# Patient Record
Sex: FEMALE | Race: WHITE | NOT HISPANIC OR LATINO | Employment: UNEMPLOYED | URBAN - NONMETROPOLITAN AREA
[De-identification: names, ages, dates, MRNs, and addresses within clinical notes are randomized per-mention and may not be internally consistent; named-entity substitution may affect disease eponyms.]

---

## 2023-05-17 ENCOUNTER — HOSPITAL ENCOUNTER (OUTPATIENT)
Dept: DATA CONVERSION | Facility: HOSPITAL | Age: 43
End: 2023-05-17
Attending: INTERNAL MEDICINE | Admitting: INTERNAL MEDICINE
Payer: COMMERCIAL

## 2023-05-17 DIAGNOSIS — F41.9 ANXIETY DISORDER, UNSPECIFIED: ICD-10-CM

## 2023-05-17 DIAGNOSIS — R16.0 HEPATOMEGALY, NOT ELSEWHERE CLASSIFIED: ICD-10-CM

## 2023-05-17 DIAGNOSIS — F32.A DEPRESSION, UNSPECIFIED: ICD-10-CM

## 2023-05-17 DIAGNOSIS — Z80.0 FAMILY HISTORY OF MALIGNANT NEOPLASM OF DIGESTIVE ORGANS: ICD-10-CM

## 2023-05-17 DIAGNOSIS — D12.5 BENIGN NEOPLASM OF SIGMOID COLON: ICD-10-CM

## 2023-05-17 DIAGNOSIS — Z12.11 ENCOUNTER FOR SCREENING FOR MALIGNANT NEOPLASM OF COLON: ICD-10-CM

## 2023-05-17 DIAGNOSIS — F17.210 NICOTINE DEPENDENCE, CIGARETTES, UNCOMPLICATED: ICD-10-CM

## 2023-05-17 LAB
ALANINE AMINOTRANSFERASE (SGPT) (U/L) IN SER/PLAS: 17 U/L (ref 7–45)
ALBUMIN (G/DL) IN SER/PLAS: 4 G/DL (ref 3.4–5)
ALKALINE PHOSPHATASE (U/L) IN SER/PLAS: 73 U/L (ref 33–110)
ASPARTATE AMINOTRANSFERASE (SGOT) (U/L) IN SER/PLAS: 17 U/L (ref 9–39)
BILIRUBIN DIRECT (MG/DL) IN SER/PLAS: 0 MG/DL (ref 0–0.3)
BILIRUBIN TOTAL (MG/DL) IN SER/PLAS: 0.6 MG/DL (ref 0–1.2)
PROTEIN TOTAL: 7.1 G/DL (ref 6.4–8.2)

## 2023-05-18 LAB
HEPATITIS A TOTAL AB INTERPRETATION: NONREACTIVE
HEPATITIS B VIRUS CORE AB (PRESENCE) IN SER/PLAS BY IMM: NONREACTIVE
HEPATITIS B VIRUS SURFACE AB (MIU/ML) IN SERUM: <3.1 MIU/ML
HEPATITIS B VIRUS SURFACE AG PRESENCE IN SERUM: NONREACTIVE
HEPATITIS C VIRUS AB PRESENCE IN SERUM: NONREACTIVE

## 2023-05-24 LAB
COMPLETE PATHOLOGY REPORT: NORMAL
CONVERTED CLINICAL DIAGNOSIS-HISTORY: NORMAL
CONVERTED FINAL DIAGNOSIS: NORMAL
CONVERTED FINAL REPORT PDF LINK TO COPY AND PASTE: NORMAL
CONVERTED GROSS DESCRIPTION: NORMAL

## 2023-09-07 VITALS
RESPIRATION RATE: 17 BRPM | HEIGHT: 65 IN | DIASTOLIC BLOOD PRESSURE: 90 MMHG | TEMPERATURE: 97.3 F | HEART RATE: 83 BPM | WEIGHT: 216.05 LBS | SYSTOLIC BLOOD PRESSURE: 137 MMHG | BODY MASS INDEX: 36 KG/M2

## 2023-09-30 NOTE — H&P
History of Present Illness:   Pregnant/Lactating:  ·  Are You Pregnant no (1)   ·  Are You Currently Breastfeeding no (1)     History Present Illness:  Reason for surgery: Screening colonoscopy, family  history of paternal uncle   HPI:    Patient is a 43-year-old female here for a colonoscopy.  She has been seen by GI and hepatology for an abnormal CT scan.  Had a recent MRI to confirm hepatomegaly.   No splenomegaly.  She has a family history of liver cancer in her father who was diagnosed in 2022 and has passed within the year.  She also has a family history of colon cancer in her paternal uncle who now has a colostomy bag and the cancer has come  back for the third time.  She has a mix of constipation and then diarrhea.  She does take Rolaids for nausea/vomiting/GERD.  She also admits to family history of Crohn's and diverticulitis.  She said it within this past year she has smoked crack cocaine,  marijuana and smokes 1/2 pack/day of cigarettes.  She also drinks alcohol socially.  She just did her hepatitis panel today.  No other red flag symptoms.  No abdominal surgeries.  No blood thinners.  She tolerated the prep well.  Denies any fever, chills,  shortness of breath, chest pain.    Past medical history: Anxiety, arthritis, depression, insomnia, tympanic membrane perforation, macrocytic anemia  Past surgical history: Ear pressure equalization, knee replacement, tonsillectomy and adenoidectomy  Social history: Everyday smoker, history of crack cocaine/marijuana, social alcohol use    Allergies:        Allergies:  ·  No Known Allergies :     Home Medication Review:   Home Medications Reviewed: yes     Impression/Procedure:   ·  Impression and Planned Procedure: Screening colonoscopy       ERAS (Enhanced Recovery After Surgery):  ·  ERAS Patient: no     Review of Systems:   Review of Systems:  Constitutional: NEGATIVE: Fever, Chills, Anorexia,  Weight Loss, Malaise     Eyes: NEGATIVE: Vision Loss/ Change      ENMT: NEGATIVE: Nasal Discharge, Nasal Congestion     Respiratory: NEGATIVE: Dry Cough, Productive Cough,  Shortness of Breath     Cardiac: NEGATIVE: Chest Pain     Gastrointestinal: POSITIVE: Diarrhea, Constipation, Abdominal Pain ; NEGATIVE: Nausea, Vomiting     Musculoskeletal: NEGATIVE: Weakness     Neurological: NEGATIVE: Headache     Skin: NEGATIVE: Rash     Hematologic/Lymph: NEGATIVE: Easy Bleeding     All Other Systems: All other systems reviewed and  are negative       Vital Signs:  Temperature C: 36.3 degrees C   Temperature F: 97.3 degrees F   Heart Rate: 83 beats per minute   Respiratory Rate: 17 breath per minute   Blood Pressure Systolic: 137 mm/Hg   Blood Pressure Diastolic: 90 mm/Hg     Physical Exam by System:    Constitutional: Well developed, A&O x3, in no apparent  distress, alert and cooperative   Eyes: PERRL, EOMI, nonicteric   ENMT: Mucous membranes moist, no apparent injury   Head/Neck: Neck supple, no apparent injury   Respiratory/Thorax: Patent airways, CTAB, normal  breath sounds with good chest expansion, thorax symmetric   Cardiovascular: Regular, normal S 1and S 2, no murmurs,  2+ equal pulses of the extremities   Gastrointestinal: Nondistended, soft, non-tender,  no rebound tenderness or guarding, no masses palpable, no organomegaly, +BS   Neurological: CN II-XII grossly intact   Psychological: Appropriate mood and behavior   Skin: Warm and dry, no lesions, no rashes     Consent:   COVID-19 Consent:  ·  COVID-19 Risk Consent Surgeon has reviewed key risks related to the risk of [place] COVID-19 and if they contract COVID-19 what the risks are.       Electronic Signatures:  Madalyn Samano (PAC)   (Signed 17-May-2023 12:39)   Authored: History of Present Illness, Allergies, Home Medication Review, Impression/Procedure, Review of Systems, Physical Exam, ERAS, Consent, Note Completion  Louis Phelan (DO)   (Signed 24-May-2023 14:34)   Co-Signer: History of Present  "Illness, Allergies, Home Medication Review, Impression/Procedure, Review of Systems, Physical Exam, ERAS, Consent, Note Completion    Last Updated: 24-May-2023 14:34 by Louis Phelan (DO)    References:  1.  Data Referenced From \"Patient Profile - Preop v3\" 17-May-2023 11:53  "

## 2023-10-13 DIAGNOSIS — K59.00 CONSTIPATION, UNSPECIFIED CONSTIPATION TYPE: ICD-10-CM

## 2023-10-13 RX ORDER — PSYLLIUM HUSK (WITH SUGAR) 3.4 G/12 G
POWDER (GRAM) ORAL
COMMUNITY
Start: 2022-11-11 | End: 2023-10-13 | Stop reason: SDUPTHER

## 2023-10-17 RX ORDER — PSYLLIUM HUSK (WITH SUGAR) 3.4 G/12 G
POWDER (GRAM) ORAL
Qty: 368 G | Refills: 0 | Status: SHIPPED | OUTPATIENT
Start: 2023-10-17 | End: 2024-03-08 | Stop reason: ALTCHOICE

## 2023-12-21 ENCOUNTER — TELEPHONE (OUTPATIENT)
Dept: PRIMARY CARE | Facility: CLINIC | Age: 43
End: 2023-12-21
Payer: COMMERCIAL

## 2023-12-21 DIAGNOSIS — U07.1 COVID-19: Primary | ICD-10-CM

## 2023-12-21 NOTE — TELEPHONE ENCOUNTER
Pt called c/o cough, sinus drainage -yellow phlegm  Covid + 12/19/2023  Allergy to tuna fish and latex

## 2023-12-22 RX ORDER — DEXAMETHASONE 4 MG/1
4 TABLET ORAL
Qty: 5 TABLET | Refills: 0 | Status: SHIPPED | OUTPATIENT
Start: 2023-12-22 | End: 2024-03-08 | Stop reason: ALTCHOICE

## 2023-12-22 RX ORDER — FLUTICASONE PROPIONATE 50 MCG
1 SPRAY, SUSPENSION (ML) NASAL DAILY
Qty: 16 G | Refills: 0 | Status: SHIPPED | OUTPATIENT
Start: 2023-12-22 | End: 2024-03-08 | Stop reason: ALTCHOICE

## 2023-12-22 RX ORDER — AZITHROMYCIN 500 MG/1
500 TABLET, FILM COATED ORAL DAILY
Qty: 5 TABLET | Refills: 0 | Status: SHIPPED | OUTPATIENT
Start: 2023-12-22 | End: 2023-12-27

## 2024-02-05 ENCOUNTER — TELEPHONE (OUTPATIENT)
Dept: PRIMARY CARE | Facility: CLINIC | Age: 44
End: 2024-02-05
Payer: COMMERCIAL

## 2024-02-05 DIAGNOSIS — F17.209 NICOTINE DEPENDENCE WITH NICOTINE-INDUCED DISORDER, UNSPECIFIED NICOTINE PRODUCT TYPE: Primary | ICD-10-CM

## 2024-02-06 RX ORDER — NICOTINE 7MG/24HR
PATCH, TRANSDERMAL 24 HOURS TRANSDERMAL
Qty: 14 PATCH | Refills: 0 | Status: SHIPPED | OUTPATIENT
Start: 2024-02-06

## 2024-02-06 RX ORDER — IBUPROFEN 200 MG
TABLET ORAL
Qty: 14 PATCH | Refills: 0 | Status: SHIPPED | OUTPATIENT
Start: 2024-02-06

## 2024-02-21 ENCOUNTER — TELEPHONE (OUTPATIENT)
Dept: OBSTETRICS AND GYNECOLOGY | Facility: CLINIC | Age: 44
End: 2024-02-21
Payer: COMMERCIAL

## 2024-02-21 DIAGNOSIS — N92.6 IRREGULAR MENSTRUAL BLEEDING: Primary | ICD-10-CM

## 2024-02-21 DIAGNOSIS — R10.2 PELVIC PAIN: ICD-10-CM

## 2024-02-21 NOTE — TELEPHONE ENCOUNTER
Spoke to pt advised of Dr Valdez message / pt agrees /Pelvic us pended / advised office will call when order signed / link to my chart test to pt

## 2024-02-21 NOTE — TELEPHONE ENCOUNTER
Patricia COX pt last seen as NP 05/23/2023 / Pt calling states that she is having irreg menses for past 3  months /pt states having menses x 3 a month lasting 4 days reg normal bleeding / pt having intermittent pelvic pain / cramps when having bleeding / painful intercourse / tired all the time/ pt wanting hysterectomy / advised message to Dr aCusey

## 2024-02-22 NOTE — TELEPHONE ENCOUNTER
Spoke to pt advised US order placed / pt will call cs to sched us / number given  Pt to  call office back with US date to sched f/up  with WC

## 2024-03-01 ENCOUNTER — HOSPITAL ENCOUNTER (OUTPATIENT)
Dept: RADIOLOGY | Facility: HOSPITAL | Age: 44
Discharge: HOME | End: 2024-03-01
Payer: COMMERCIAL

## 2024-03-01 DIAGNOSIS — N92.6 IRREGULAR MENSTRUAL BLEEDING: ICD-10-CM

## 2024-03-01 DIAGNOSIS — R10.2 PELVIC PAIN: ICD-10-CM

## 2024-03-01 PROCEDURE — 76856 US EXAM PELVIC COMPLETE: CPT | Performed by: RADIOLOGY

## 2024-03-01 PROCEDURE — 76830 TRANSVAGINAL US NON-OB: CPT | Performed by: RADIOLOGY

## 2024-03-01 PROCEDURE — 76856 US EXAM PELVIC COMPLETE: CPT

## 2024-03-08 ENCOUNTER — LAB (OUTPATIENT)
Dept: LAB | Facility: LAB | Age: 44
End: 2024-03-08
Payer: COMMERCIAL

## 2024-03-08 ENCOUNTER — TELEPHONE (OUTPATIENT)
Dept: OBSTETRICS AND GYNECOLOGY | Facility: CLINIC | Age: 44
End: 2024-03-08

## 2024-03-08 ENCOUNTER — OFFICE VISIT (OUTPATIENT)
Dept: OBSTETRICS AND GYNECOLOGY | Facility: CLINIC | Age: 44
End: 2024-03-08
Payer: COMMERCIAL

## 2024-03-08 VITALS
SYSTOLIC BLOOD PRESSURE: 122 MMHG | DIASTOLIC BLOOD PRESSURE: 68 MMHG | HEIGHT: 65 IN | WEIGHT: 231.2 LBS | BODY MASS INDEX: 38.52 KG/M2

## 2024-03-08 DIAGNOSIS — R93.89 THICKENED ENDOMETRIUM: ICD-10-CM

## 2024-03-08 DIAGNOSIS — N92.6 IRREGULAR MENSTRUAL BLEEDING: Primary | ICD-10-CM

## 2024-03-08 DIAGNOSIS — R10.2 PELVIC PAIN: ICD-10-CM

## 2024-03-08 DIAGNOSIS — N92.6 IRREGULAR MENSTRUAL BLEEDING: ICD-10-CM

## 2024-03-08 LAB
ERYTHROCYTE [DISTWIDTH] IN BLOOD BY AUTOMATED COUNT: 16.1 % (ref 11.5–14.5)
HCT VFR BLD AUTO: 37.8 % (ref 36–46)
HGB BLD-MCNC: 11.4 G/DL (ref 12–16)
MCH RBC QN AUTO: 20.5 PG (ref 26–34)
MCHC RBC AUTO-ENTMCNC: 30.2 G/DL (ref 32–36)
MCV RBC AUTO: 68 FL (ref 80–100)
NRBC BLD-RTO: 0 /100 WBCS (ref 0–0)
PLATELET # BLD AUTO: 448 X10*3/UL (ref 150–450)
RBC # BLD AUTO: 5.55 X10*6/UL (ref 4–5.2)
TSH SERPL DL<=0.05 MIU/L-ACNC: 1.98 MIU/L (ref 0.27–4.2)
WBC # BLD AUTO: 10.9 X10*3/UL (ref 4.4–11.3)

## 2024-03-08 PROCEDURE — 36415 COLL VENOUS BLD VENIPUNCTURE: CPT

## 2024-03-08 PROCEDURE — 83001 ASSAY OF GONADOTROPIN (FSH): CPT

## 2024-03-08 PROCEDURE — 99214 OFFICE O/P EST MOD 30 MIN: CPT | Performed by: OBSTETRICS & GYNECOLOGY

## 2024-03-08 PROCEDURE — 84443 ASSAY THYROID STIM HORMONE: CPT

## 2024-03-08 PROCEDURE — 85027 COMPLETE CBC AUTOMATED: CPT

## 2024-03-08 PROCEDURE — 3008F BODY MASS INDEX DOCD: CPT | Performed by: OBSTETRICS & GYNECOLOGY

## 2024-03-08 RX ORDER — PANTOPRAZOLE SODIUM 40 MG/1
1 TABLET, DELAYED RELEASE ORAL
COMMUNITY
Start: 2022-11-07

## 2024-03-08 RX ORDER — QUETIAPINE 200 MG/1
200 TABLET, FILM COATED, EXTENDED RELEASE ORAL NIGHTLY
COMMUNITY
Start: 2024-02-05

## 2024-03-08 ASSESSMENT — PAIN SCALES - GENERAL: PAINLEVEL: 0-NO PAIN

## 2024-03-08 ASSESSMENT — PATIENT HEALTH QUESTIONNAIRE - PHQ9
1. LITTLE INTEREST OR PLEASURE IN DOING THINGS: NOT AT ALL
2. FEELING DOWN, DEPRESSED OR HOPELESS: NOT AT ALL
SUM OF ALL RESPONSES TO PHQ9 QUESTIONS 1 & 2: 0

## 2024-03-08 ASSESSMENT — LIFESTYLE VARIABLES
HOW MANY STANDARD DRINKS CONTAINING ALCOHOL DO YOU HAVE ON A TYPICAL DAY: PATIENT DOES NOT DRINK
HOW OFTEN DO YOU HAVE A DRINK CONTAINING ALCOHOL: NEVER
SKIP TO QUESTIONS 9-10: 1
HOW OFTEN DO YOU HAVE SIX OR MORE DRINKS ON ONE OCCASION: NEVER
AUDIT-C TOTAL SCORE: 0

## 2024-03-08 ASSESSMENT — ENCOUNTER SYMPTOMS
OCCASIONAL FEELINGS OF UNSTEADINESS: 0
DEPRESSION: 0
LOSS OF SENSATION IN FEET: 0

## 2024-03-08 NOTE — TELEPHONE ENCOUNTER
Pt scheduled endo biopsy for 04/04. Cytotec 200 mg called to patients local pharmacy to take morning of her procedure. #1 with 0 refills

## 2024-03-08 NOTE — PROGRESS NOTES
"Karen Ordonez comes in for gyn concern of frequent menstrual bleeding, every 2 to 3 weeks and disruptive menstrual cramping during her bleeding episodes.  Prior to this appointment she completed a pelvic ultrasound which shows: 8 cm uterus/endometrium of 1.3 cm/2.4 cm serosal fibroid; ovaries with polycystic appearance.   she is on no medications to regulate/does not need bcm d/t lesbian.  PMHx: positive for severe depression/schizophrenia/PTSD/nicotine use; at visit today, patient also reports use of cocaine, cannabis, and alcohol in the past several days.  She is under care at [facility].  Visit Vitals  /68   Ht 1.651 m (5' 5\")   Wt 105 kg (231 lb 3.2 oz)   LMP 03/01/2024 (Approximate)   BMI 38.47 kg/m²   OB Status Having periods   Smoking Status Every Day   BSA 2.19 m²    Constitutional/general:Well developed/Well nourished./BMI 38   Neuro/psych:Oriented x 3; Mood/affect= mildly anxious; accompanied by mother-in-law for support  Gyn:   External genitalia: Grossly normal  Urethra: Normal  Vagina: Normal; no blood or exudate at present  Pelvic support: Normal  Cervix: Grossly normal; nulliparous/able to insert Cytobrush into canal  Uterus: No gross enlargement or tenderness  Adnexa: No palpable masses or tenderness; exam limited by BMI  Anus/perineum: Grossly normal  Encounter Diagnoses   Name Primary?    Irregular menstrual bleeding; us and hx c/w pcos;  at risk for endometrial pathology d/t age, nulliparity, and high bmi.  Yes    Thickened endometrium; needs biopsy;sched for pt/cytotec pretx.     Pelvic pain; exam and ultrasound benign.     BMI 38.0-38.9,adult; adiposity increases endogenous estrogen.          "

## 2024-03-09 PROBLEM — R93.89 THICKENED ENDOMETRIUM: Status: ACTIVE | Noted: 2024-03-09

## 2024-03-09 PROBLEM — R10.2 PELVIC PAIN: Status: ACTIVE | Noted: 2024-03-09

## 2024-03-09 PROBLEM — N92.6 IRREGULAR MENSTRUAL BLEEDING: Status: ACTIVE | Noted: 2024-03-09

## 2024-03-09 LAB — FSH SERPL-ACNC: 5.8 IU/L

## 2024-04-01 ENCOUNTER — TELEPHONE (OUTPATIENT)
Dept: OBSTETRICS AND GYNECOLOGY | Facility: CLINIC | Age: 44
End: 2024-04-01
Payer: COMMERCIAL

## 2024-04-01 NOTE — PROGRESS NOTES
Karen Mery comes in for gyn concern of ***        LMP 03/01/2024 (Approximate)      Constitutional/general:  Well developed...  Well nourished...  Habitus..     Neuro/psych:  Oriented x 3  Mood/affect    Gyn:   External genitalia:  Urethra:  Vagina:  Pelvic support:  Cervix:  Uterus:  Adnexa:  Anus/perineum:

## 2024-04-01 NOTE — TELEPHONE ENCOUNTER
Received message from Jose that pt cancelled appt for EMB 04/03/2024 due to illness / pt states she does not know when she will be feeling better and she will call to resched .

## 2024-04-03 ENCOUNTER — APPOINTMENT (OUTPATIENT)
Dept: OBSTETRICS AND GYNECOLOGY | Facility: CLINIC | Age: 44
End: 2024-04-03
Payer: COMMERCIAL

## 2024-04-12 ENCOUNTER — APPOINTMENT (OUTPATIENT)
Dept: PRIMARY CARE | Facility: CLINIC | Age: 44
End: 2024-04-12
Payer: COMMERCIAL

## 2024-05-14 ENCOUNTER — APPOINTMENT (OUTPATIENT)
Dept: PRIMARY CARE | Facility: CLINIC | Age: 44
End: 2024-05-14
Payer: COMMERCIAL

## 2024-06-11 ENCOUNTER — HOSPITAL ENCOUNTER (EMERGENCY)
Facility: HOSPITAL | Age: 44
Discharge: HOME | End: 2024-06-11
Attending: EMERGENCY MEDICINE
Payer: COMMERCIAL

## 2024-06-11 ENCOUNTER — APPOINTMENT (OUTPATIENT)
Dept: RADIOLOGY | Facility: HOSPITAL | Age: 44
End: 2024-06-11
Payer: COMMERCIAL

## 2024-06-11 ENCOUNTER — APPOINTMENT (OUTPATIENT)
Dept: CARDIOLOGY | Facility: HOSPITAL | Age: 44
End: 2024-06-11
Payer: COMMERCIAL

## 2024-06-11 VITALS
HEIGHT: 65 IN | RESPIRATION RATE: 18 BRPM | HEART RATE: 68 BPM | OXYGEN SATURATION: 98 % | WEIGHT: 280 LBS | SYSTOLIC BLOOD PRESSURE: 119 MMHG | DIASTOLIC BLOOD PRESSURE: 89 MMHG | BODY MASS INDEX: 46.65 KG/M2 | TEMPERATURE: 98.3 F

## 2024-06-11 DIAGNOSIS — R07.9 CHEST PAIN, UNSPECIFIED TYPE: Primary | ICD-10-CM

## 2024-06-11 LAB
ALBUMIN SERPL BCP-MCNC: 3.8 G/DL (ref 3.4–5)
ALP SERPL-CCNC: 62 U/L (ref 33–110)
ALT SERPL W P-5'-P-CCNC: 14 U/L (ref 7–45)
ANION GAP SERPL CALC-SCNC: 9 MMOL/L (ref 10–20)
AST SERPL W P-5'-P-CCNC: 13 U/L (ref 9–39)
ATRIAL RATE: 67 BPM
ATRIAL RATE: 67 BPM
BASOPHILS # BLD AUTO: 0.07 X10*3/UL (ref 0–0.1)
BASOPHILS NFR BLD AUTO: 0.7 %
BILIRUB SERPL-MCNC: 0.4 MG/DL (ref 0–1.2)
BUN SERPL-MCNC: 12 MG/DL (ref 6–23)
CALCIUM SERPL-MCNC: 9.1 MG/DL (ref 8.6–10.3)
CARDIAC TROPONIN I PNL SERPL HS: 3 NG/L (ref 0–13)
CARDIAC TROPONIN I PNL SERPL HS: 4 NG/L (ref 0–13)
CHLORIDE SERPL-SCNC: 104 MMOL/L (ref 98–107)
CO2 SERPL-SCNC: 28 MMOL/L (ref 21–32)
CREAT SERPL-MCNC: 0.81 MG/DL (ref 0.5–1.05)
D DIMER PPP FEU-MCNC: 303 NG/ML FEU
EGFRCR SERPLBLD CKD-EPI 2021: >90 ML/MIN/1.73M*2
EOSINOPHIL # BLD AUTO: 0.16 X10*3/UL (ref 0–0.7)
EOSINOPHIL NFR BLD AUTO: 1.5 %
ERYTHROCYTE [DISTWIDTH] IN BLOOD BY AUTOMATED COUNT: 17.3 % (ref 11.5–14.5)
GLUCOSE SERPL-MCNC: 90 MG/DL (ref 74–99)
HCT VFR BLD AUTO: 37.3 % (ref 36–46)
HGB BLD-MCNC: 11.2 G/DL (ref 12–16)
IMM GRANULOCYTES # BLD AUTO: 0.04 X10*3/UL (ref 0–0.7)
IMM GRANULOCYTES NFR BLD AUTO: 0.4 % (ref 0–0.9)
LYMPHOCYTES # BLD AUTO: 3.28 X10*3/UL (ref 1.2–4.8)
LYMPHOCYTES NFR BLD AUTO: 30.7 %
MAGNESIUM SERPL-MCNC: 1.94 MG/DL (ref 1.6–2.4)
MCH RBC QN AUTO: 20.5 PG (ref 26–34)
MCHC RBC AUTO-ENTMCNC: 30 G/DL (ref 32–36)
MCV RBC AUTO: 68 FL (ref 80–100)
MONOCYTES # BLD AUTO: 0.76 X10*3/UL (ref 0.1–1)
MONOCYTES NFR BLD AUTO: 7.1 %
NEUTROPHILS # BLD AUTO: 6.39 X10*3/UL (ref 1.2–7.7)
NEUTROPHILS NFR BLD AUTO: 59.6 %
NRBC BLD-RTO: 0 /100 WBCS (ref 0–0)
P AXIS: -15 DEGREES
P AXIS: -21 DEGREES
P OFFSET: 189 MS
P OFFSET: 189 MS
P ONSET: 136 MS
P ONSET: 139 MS
PLATELET # BLD AUTO: 421 X10*3/UL (ref 150–450)
POTASSIUM SERPL-SCNC: 4 MMOL/L (ref 3.5–5.3)
PR INTERVAL: 160 MS
PR INTERVAL: 164 MS
PROT SERPL-MCNC: 7.2 G/DL (ref 6.4–8.2)
Q ONSET: 218 MS
Q ONSET: 219 MS
QRS COUNT: 11 BEATS
QRS COUNT: 12 BEATS
QRS DURATION: 86 MS
QRS DURATION: 92 MS
QT INTERVAL: 410 MS
QT INTERVAL: 412 MS
QTC CALCULATION(BAZETT): 433 MS
QTC CALCULATION(BAZETT): 435 MS
QTC FREDERICIA: 425 MS
QTC FREDERICIA: 427 MS
R AXIS: 50 DEGREES
R AXIS: 56 DEGREES
RBC # BLD AUTO: 5.47 X10*6/UL (ref 4–5.2)
SODIUM SERPL-SCNC: 137 MMOL/L (ref 136–145)
T AXIS: 11 DEGREES
T AXIS: 16 DEGREES
T OFFSET: 423 MS
T OFFSET: 425 MS
VENTRICULAR RATE: 67 BPM
VENTRICULAR RATE: 67 BPM
WBC # BLD AUTO: 10.7 X10*3/UL (ref 4.4–11.3)

## 2024-06-11 PROCEDURE — 85379 FIBRIN DEGRADATION QUANT: CPT | Performed by: EMERGENCY MEDICINE

## 2024-06-11 PROCEDURE — 71045 X-RAY EXAM CHEST 1 VIEW: CPT

## 2024-06-11 PROCEDURE — 36415 COLL VENOUS BLD VENIPUNCTURE: CPT | Performed by: EMERGENCY MEDICINE

## 2024-06-11 PROCEDURE — 83735 ASSAY OF MAGNESIUM: CPT | Performed by: EMERGENCY MEDICINE

## 2024-06-11 PROCEDURE — 84484 ASSAY OF TROPONIN QUANT: CPT | Performed by: EMERGENCY MEDICINE

## 2024-06-11 PROCEDURE — 85025 COMPLETE CBC W/AUTO DIFF WBC: CPT | Performed by: EMERGENCY MEDICINE

## 2024-06-11 PROCEDURE — 71045 X-RAY EXAM CHEST 1 VIEW: CPT | Mod: FOREIGN READ | Performed by: RADIOLOGY

## 2024-06-11 PROCEDURE — 84075 ASSAY ALKALINE PHOSPHATASE: CPT | Performed by: EMERGENCY MEDICINE

## 2024-06-11 PROCEDURE — 99283 EMERGENCY DEPT VISIT LOW MDM: CPT | Mod: 25

## 2024-06-11 PROCEDURE — 93005 ELECTROCARDIOGRAM TRACING: CPT | Mod: 76

## 2024-06-11 PROCEDURE — 93005 ELECTROCARDIOGRAM TRACING: CPT

## 2024-06-11 ASSESSMENT — PAIN SCALES - GENERAL: PAINLEVEL_OUTOF10: 0 - NO PAIN

## 2024-06-11 ASSESSMENT — PAIN - FUNCTIONAL ASSESSMENT: PAIN_FUNCTIONAL_ASSESSMENT: 0-10

## 2024-06-11 ASSESSMENT — PAIN DESCRIPTION - DESCRIPTORS: DESCRIPTORS: SHARP

## 2024-06-11 NOTE — ED PROVIDER NOTES
HPI   Chief Complaint   Patient presents with    Chest Pain     Midsternal chest pain that started last night while sleeping, states had 4 sharp pains that lasted about one minute each. States she was also diaphoretic. Today she had two more sharp pains that lasted 5 seconds today. No pain at this time       HPI                    [place] Coma Scale Score: 15                     Patient History   Past Medical History:   Diagnosis Date    PTSD (post-traumatic stress disorder)     Schizophrenia (Multi)     Severe depression (Multi)      Past Surgical History:   Procedure Laterality Date    OTHER SURGICAL HISTORY  07/18/2022    Knee surgery    OTHER SURGICAL HISTORY  09/27/2022    Tonsillectomy with adenoidectomy    OTHER SURGICAL HISTORY  09/27/2022    Ear pressure equalization tube insertion     No family history on file.  Social History     Tobacco Use    Smoking status: Every Day     Current packs/day: 0.50     Types: Cigarettes    Smokeless tobacco: Never   Vaping Use    Vaping status: Never Used   Substance Use Topics    Alcohol use: Not Currently    Drug use: Yes     Types: Marijuana       Physical Exam   ED Triage Vitals [06/11/24 1253]   Temperature Heart Rate Respirations BP   36.8 °C (98.3 °F) 71 16 128/85      Pulse Ox Temp Source Heart Rate Source Patient Position   97 % Oral Monitor Lying      BP Location FiO2 (%)     Right arm --       Physical Exam  Constitutional:       General: She is not in acute distress.     Appearance: Normal appearance. She is not toxic-appearing.   HENT:      Head: Normocephalic and atraumatic.      Right Ear: Tympanic membrane normal.      Left Ear: Tympanic membrane normal.      Mouth/Throat:      Mouth: Mucous membranes are moist.      Pharynx: Oropharynx is clear.   Eyes:      Conjunctiva/sclera: Conjunctivae normal.      Pupils: Pupils are equal, round, and reactive to light.   Cardiovascular:      Rate and Rhythm: Normal rate and regular rhythm.      Pulses: Normal pulses.       Heart sounds: Normal heart sounds.   Pulmonary:      Effort: Pulmonary effort is normal. No respiratory distress.      Breath sounds: Normal breath sounds. No wheezing.   Abdominal:      General: Bowel sounds are normal.      Palpations: Abdomen is soft.      Tenderness: There is no abdominal tenderness. There is no guarding or rebound.   Musculoskeletal:         General: Normal range of motion.      Cervical back: Normal range of motion.   Skin:     General: Skin is warm and dry.   Neurological:      General: No focal deficit present.      Mental Status: She is alert and oriented to person, place, and time.         ED Course & MDM   ED Course as of 06/11/24 1453   Tue Jun 11, 2024   1247 EKG was performed at 1247 showing normal sinus rhythm ventricular rate of 67 no ST elevation or depression essentially normal EKG  Interpreted by me [KA]   1334 EKG performed at 1334 showing normal sinus rhythm no ST elevation or depression essentially normal EKG ventricular rate is 67 interpreted by me. [KA]      ED Course User Index  [KA] Jam Covarrubias DO         Diagnoses as of 06/11/24 1453   Chest pain, unspecified type       Medical Decision Making  44-year-old female presents to the ER with chief complaint of some sharp pain that she feels in her chest.  Patient came to the ED for evaluation.  Patient reports the pain is gone patient reports that she felt this pain last night came to the ED via 911 today.  Workup in the ED is negative both EKGs are unremarkable D-dimer is negative chest x-ray is negative troponins x 2 are also negative.  From history standpoint does not sound cardiac in nature.  Patient has a normal physical exam no abdominal tenderness at this time.  Plan is to discharge patient home to follow-up with family physician.        Procedure  Procedures     Jam Covarrubias DO  06/11/24 1457

## 2024-06-20 LAB
ATRIAL RATE: 67 BPM
ATRIAL RATE: 67 BPM
P AXIS: -15 DEGREES
P AXIS: -21 DEGREES
P OFFSET: 189 MS
P OFFSET: 189 MS
P ONSET: 136 MS
P ONSET: 139 MS
PR INTERVAL: 160 MS
PR INTERVAL: 164 MS
Q ONSET: 218 MS
Q ONSET: 219 MS
QRS COUNT: 11 BEATS
QRS COUNT: 12 BEATS
QRS DURATION: 86 MS
QRS DURATION: 92 MS
QT INTERVAL: 410 MS
QT INTERVAL: 412 MS
QTC CALCULATION(BAZETT): 433 MS
QTC CALCULATION(BAZETT): 435 MS
QTC FREDERICIA: 425 MS
QTC FREDERICIA: 427 MS
R AXIS: 50 DEGREES
R AXIS: 56 DEGREES
T AXIS: 11 DEGREES
T AXIS: 16 DEGREES
T OFFSET: 423 MS
T OFFSET: 425 MS
VENTRICULAR RATE: 67 BPM
VENTRICULAR RATE: 67 BPM

## 2024-07-09 ENCOUNTER — APPOINTMENT (OUTPATIENT)
Dept: PRIMARY CARE | Facility: CLINIC | Age: 44
End: 2024-07-09
Payer: COMMERCIAL

## 2025-02-26 ENCOUNTER — APPOINTMENT (OUTPATIENT)
Dept: DERMATOLOGY | Facility: CLINIC | Age: 45
End: 2025-02-26
Payer: COMMERCIAL

## 2025-04-16 ENCOUNTER — APPOINTMENT (OUTPATIENT)
Dept: PRIMARY CARE | Facility: CLINIC | Age: 45
End: 2025-04-16
Payer: COMMERCIAL

## 2025-04-16 VITALS
WEIGHT: 225 LBS | RESPIRATION RATE: 18 BRPM | HEART RATE: 87 BPM | OXYGEN SATURATION: 94 % | DIASTOLIC BLOOD PRESSURE: 85 MMHG | BODY MASS INDEX: 37.49 KG/M2 | SYSTOLIC BLOOD PRESSURE: 128 MMHG | HEIGHT: 65 IN

## 2025-04-16 DIAGNOSIS — Z13.220 LIPID SCREENING: ICD-10-CM

## 2025-04-16 DIAGNOSIS — F17.209 NICOTINE DEPENDENCE WITH NICOTINE-INDUCED DISORDER, UNSPECIFIED NICOTINE PRODUCT TYPE: ICD-10-CM

## 2025-04-16 DIAGNOSIS — G47.00 INSOMNIA, UNSPECIFIED TYPE: ICD-10-CM

## 2025-04-16 DIAGNOSIS — E55.9 VITAMIN D DEFICIENCY: ICD-10-CM

## 2025-04-16 DIAGNOSIS — Z00.00 HEALTHCARE MAINTENANCE: ICD-10-CM

## 2025-04-16 DIAGNOSIS — K21.9 GASTROESOPHAGEAL REFLUX DISEASE WITHOUT ESOPHAGITIS: ICD-10-CM

## 2025-04-16 DIAGNOSIS — Z13.21 ENCOUNTER FOR VITAMIN DEFICIENCY SCREENING: ICD-10-CM

## 2025-04-16 DIAGNOSIS — Z12.31 ENCOUNTER FOR SCREENING MAMMOGRAM FOR MALIGNANT NEOPLASM OF BREAST: ICD-10-CM

## 2025-04-16 DIAGNOSIS — R05.9 COUGH, UNSPECIFIED TYPE: ICD-10-CM

## 2025-04-16 DIAGNOSIS — Z13.29 THYROID DISORDER SCREENING: ICD-10-CM

## 2025-04-16 PROCEDURE — 99214 OFFICE O/P EST MOD 30 MIN: CPT | Performed by: INTERNAL MEDICINE

## 2025-04-16 PROCEDURE — 3008F BODY MASS INDEX DOCD: CPT | Performed by: INTERNAL MEDICINE

## 2025-04-16 RX ORDER — IBUPROFEN 200 MG
TABLET ORAL
Qty: 14 PATCH | Refills: 0 | Status: SHIPPED | OUTPATIENT
Start: 2025-04-16

## 2025-04-16 RX ORDER — NICOTINE 7MG/24HR
PATCH, TRANSDERMAL 24 HOURS TRANSDERMAL
Qty: 14 PATCH | Refills: 0 | Status: SHIPPED | OUTPATIENT
Start: 2025-04-16

## 2025-04-16 RX ORDER — TRAZODONE HYDROCHLORIDE 50 MG/1
50 TABLET ORAL NIGHTLY PRN
Qty: 30 TABLET | Refills: 0 | Status: SHIPPED | OUTPATIENT
Start: 2025-04-16 | End: 2026-04-16

## 2025-04-16 RX ORDER — PANTOPRAZOLE SODIUM 40 MG/1
40 TABLET, DELAYED RELEASE ORAL DAILY
Qty: 30 TABLET | Refills: 1 | Status: SHIPPED | OUTPATIENT
Start: 2025-04-16 | End: 2025-06-15

## 2025-04-16 ASSESSMENT — PATIENT HEALTH QUESTIONNAIRE - PHQ9
SUM OF ALL RESPONSES TO PHQ9 QUESTIONS 1 AND 2: 1
1. LITTLE INTEREST OR PLEASURE IN DOING THINGS: NOT AT ALL
2. FEELING DOWN, DEPRESSED OR HOPELESS: SEVERAL DAYS

## 2025-04-16 ASSESSMENT — PAIN SCALES - GENERAL: PAINLEVEL_OUTOF10: 2

## 2025-04-16 NOTE — PROGRESS NOTES
Subjective   Patient ID:   Karen Ordonez is a 45 y.o. female who presents for Annual Exam.  HPI  Pain scale:   Living will? No  POA? No  Are you currently or have you recently been threatened or abused? No  Do you feel unsafe going back to the place you are living? No  Reported health: Good  Dental visits? No  Hearing problems? Yes - does not wear hearing aids  Vision problems? Yes - wears glasses  Healthy diet? No  Exercise? No exercise  Adequate fluid intake? Yes    Social History[1]    Immunization History   Administered Date(s) Administered    COVID-19, mRNA, LNP-S, PF, 30 mcg/0.3 mL dose 05/11/2021, 06/01/2021    Pfizer COVID-19 vaccine, bivalent, age 12 years and older (30 mcg/0.3 mL) 11/14/2022       Review of Systems  12 point review of systems negative unless stated above in HPI    There were no vitals filed for this visit.    Physical Exam  General: Alert and oriented, well nourished, no acute distress.  Lungs: Clear to auscultation, non-labored respiration.  Heart: Normal rate, regular rhythm, no murmur, gallop or edema.  Neurologic: Awake, alert, and oriented X3, CN II-XII intact.  Psychiatric: Cooperative, appropriate mood and affect.    Assessment/Plan          [1]   Social History  Tobacco Use    Smoking status: Every Day     Current packs/day: 0.50     Types: Cigarettes    Smokeless tobacco: Never   Vaping Use    Vaping status: Never Used   Substance Use Topics    Alcohol use: Not Currently    Drug use: Yes     Types: Marijuana

## 2025-04-16 NOTE — PROGRESS NOTES
"Patient ID: Karen Ordonez is a 45 y.o. female with PMH remarkable for severe depression/schizophrenia/PTSD/nicotine  who presents to the office today for follow up.     HEALTH MAINTENANCE: FOLLOW UP   Last Office Visit: 2022  Mammogram (40-75): 10/14/22 abnormal mammogram, had US of right breast, benign, advised to repeat screening mammo in one year, DUE  Pap smear (21-65, or hysterectomy q5yrs): per ob/gyn  Last Labs: 6/11/24  Colonoscopy (45-75 or age 40 with 1st degree relative dx colon ca): 5/17/23, 9mm polyp removed(Tubular adenoma), advised to repeat in five years  Lung cancer screening (50-76 y/o x 20 pk yr for at least 20 yrs + current smoker OR quit in last 15 years, no CT w/I last year): na age  DEXA (65+, q 2 years): na age    --seen in ER on 06/11/24 for chest pain, per ER note: \"44-year-old female presents to the ER with chief complaint of some sharp pain that she feels in her chest.  Patient came to the ED for evaluation.  Patient reports the pain is gone patient reports that she felt this pain last night came to the ED via 911 today.  Workup in the ED is negative both EKGs are unremarkable D-dimer is negative chest x-ray is negative troponins x 2 are also negative.  From history standpoint does not sound cardiac in nature.  Patient has a normal physical exam no abdominal tenderness at this time.  Plan is to discharge patient home to follow-up with family physician.\"       HPI She states that her depression is resolved. She state she has not done any drugs other than very small amounts of marijuana every other day for joint pain. She does not feel anxious or depressed. Her wife is with her today, and she reports that she has been doing \"pretty good\". She states that she has been on Wellbutrin in the past, did not like how it made her feel. She states she is inactive. She states she has acid reflux. She has constipation if she eats cheese. She states she takes dairy pills to avoid diarrhea when " "eating dairy. She states that she has tried to increase her water intake. She states she has cut back on soda intake. She states that she is urinating ok. She states she is unable to sleep at night, has insomnia. She states that she sometimes has \"coughing jags\", would like to have albuterol inhaler filled. She avoids tylenol.     Social History[1]    Review of Systems   Constitutional: Negative.    HENT: Negative.     Respiratory:  Positive for cough.    Cardiovascular: Negative.    Gastrointestinal: Negative.    Genitourinary: Negative.    Musculoskeletal:  Positive for arthralgias.   Neurological: Negative.    Psychiatric/Behavioral:  Positive for sleep disturbance.        Visit Vitals  Vitals:    04/16/25 1422   BP: 128/85   BP Location: Left arm   Patient Position: Sitting   Pulse: 87   Resp: 18   SpO2: 94%   Weight: 102 kg (225 lb)   Height: 1.651 m (5' 5\")      OB Status Having periods   Smoking Status Every Day     Physical Exam  Vitals reviewed.   Constitutional:       Appearance: Normal appearance.   HENT:      Head: Normocephalic and atraumatic.   Cardiovascular:      Rate and Rhythm: Normal rate and regular rhythm.      Pulses: Normal pulses.      Heart sounds: Normal heart sounds.   Pulmonary:      Effort: Pulmonary effort is normal.      Breath sounds: Normal breath sounds.   Abdominal:      General: Bowel sounds are normal.      Palpations: Abdomen is soft.   Musculoskeletal:         General: Normal range of motion.      Cervical back: Normal range of motion.   Skin:     General: Skin is warm and dry.   Neurological:      General: No focal deficit present.      Mental Status: She is alert and oriented to person, place, and time.   Psychiatric:         Mood and Affect: Mood normal.         Behavior: Behavior normal.       Current Outpatient Medications   Medication Instructions    melatonin 10 mg, oral, Daily, gummies    nicotine (Nicoderm CQ) 14 mg/24 hr patch After finishing the 21mg start the 14mg x " 14 days.    nicotine (Nicoderm CQ) 21 mg/24 hr patch Use daily x  14 days.    nicotine (Nicoderm CQ) 7 mg/24 hr patch After finishing the 14mg start 7mg patches.    pantoprazole (ProtoNix) 40 mg EC tablet 1 tablet, oral, Daily before breakfast    QUEtiapine XR (SEROQUEL XR) 200 mg, oral, Nightly      Lab Results   Component Value Date    WBC 10.7 06/11/2024    HGB 11.2 (L) 06/11/2024    HCT 37.3 06/11/2024     06/11/2024    CHOL 172 09/16/2022    TRIG 249 (H) 09/16/2022    HDL 29.0 (A) 09/16/2022    ALT 14 06/11/2024    AST 13 06/11/2024     06/11/2024    K 4.0 06/11/2024     06/11/2024    CREATININE 0.81 06/11/2024    BUN 12 06/11/2024    CO2 28 06/11/2024    TSH 1.98 03/08/2024       Problem List Items Addressed This Visit           ICD-10-CM    Gastroesophageal reflux disease without esophagitis K21.9    Relevant Medications    pantoprazole (ProtoNix) 40 mg EC tablet    Other Relevant Orders    CBC and Auto Differential    Insomnia G47.00    Relevant Medications    traZODone (Desyrel) 50 mg tablet    Other Relevant Orders    Comprehensive metabolic panel     Other Visit Diagnoses         Codes      Encounter for screening mammogram for malignant neoplasm of breast     Z12.31    Relevant Orders    BI mammo bilateral screening tomosynthesis      Healthcare maintenance     Z00.00      Lipid screening     Z13.220    Relevant Orders    Lipid panel      Thyroid disorder screening     Z13.29    Relevant Orders    Tsh With Reflex To Free T4 If Abnormal      Vitamin D deficiency     E55.9    Relevant Orders    Vitamin D 25-Hydroxy,Total (for eval of Vitamin D levels)      Encounter for vitamin deficiency screening     Z13.21    Relevant Orders    Vitamin B12      Nicotine dependence with nicotine-induced disorder, unspecified nicotine product type     F17.209    Relevant Medications    nicotine (Nicoderm CQ) 21 mg/24 hr patch    nicotine (Nicoderm CQ) 14 mg/24 hr patch    nicotine (Nicoderm CQ) 7 mg/24  hr patch            --------------------  Written by Angela Trent RN, acting as a scribe for Dr. Cutler. This note accurately reflects the work and decisions made by Dr. Cutler.     I, Dr. Cutler, attest all medical record entries made by the scribe were under my direction and were personally dictated by me. I have reviewed the chart and agree that the record accurately reflects my performance of the history, physical exam, and assessment and plan.          [1]   Social History  Tobacco Use    Smoking status: Every Day     Current packs/day: 0.50     Types: Cigarettes    Smokeless tobacco: Never   Vaping Use    Vaping status: Never Used   Substance Use Topics    Alcohol use: Not Currently    Drug use: Yes     Types: Marijuana

## 2025-04-17 PROBLEM — G47.00 INSOMNIA: Status: ACTIVE | Noted: 2025-04-17

## 2025-04-17 RX ORDER — ALBUTEROL SULFATE 90 UG/1
2 INHALANT RESPIRATORY (INHALATION) EVERY 4 HOURS PRN
Qty: 8 G | Refills: 1 | Status: SHIPPED | OUTPATIENT
Start: 2025-04-17 | End: 2026-04-17

## 2025-04-17 ASSESSMENT — ENCOUNTER SYMPTOMS
SLEEP DISTURBANCE: 1
GASTROINTESTINAL NEGATIVE: 1
COUGH: 1
CONSTITUTIONAL NEGATIVE: 1
NEUROLOGICAL NEGATIVE: 1
CARDIOVASCULAR NEGATIVE: 1
ARTHRALGIAS: 1

## 2025-04-17 NOTE — PATIENT INSTRUCTIONS
It was great to see you in the office today! Here is what we discussed at your visit today:    Please continue to take your current medications     Please have your blood drawn in the next 1-2 weeks.   You need to be FASTING for 12 hours prior to blood draw.  Please contact your insurance company to ask about the best location to get blood drawn.  We will contact you with the results of your blood work and any necessary adjustments to your plan of care.  Iif you do not hear from us within 3-5 days of having your blood drawn, please call the [place] office at [phone].    I have ordered you a mammogram to be done as soon as you are able.  Someone will call you soon to schedule this.   We will call the results.    Prescriptions were sent in for Trazodone and nicotine patches, use as directed    Follow up in six months

## 2025-05-14 ENCOUNTER — TELEPHONE (OUTPATIENT)
Dept: PRIMARY CARE | Facility: CLINIC | Age: 45
End: 2025-05-14
Payer: COMMERCIAL

## 2025-05-14 DIAGNOSIS — R31.9 HEMATURIA, UNSPECIFIED TYPE: Primary | ICD-10-CM

## 2025-05-14 DIAGNOSIS — E78.00 HYPERCHOLESTEREMIA: Primary | ICD-10-CM

## 2025-05-14 DIAGNOSIS — D64.9 LOW HEMOGLOBIN: ICD-10-CM

## 2025-05-14 DIAGNOSIS — E53.8 VITAMIN B12 DEFICIENCY: ICD-10-CM

## 2025-05-14 LAB
25(OH)D3+25(OH)D2 SERPL-MCNC: 20 NG/ML (ref 30–100)
ALBUMIN SERPL-MCNC: 4 G/DL (ref 3.6–5.1)
ALP SERPL-CCNC: 71 U/L (ref 31–125)
ALT SERPL-CCNC: 17 U/L (ref 6–29)
ANION GAP SERPL CALCULATED.4IONS-SCNC: 9 MMOL/L (CALC) (ref 7–17)
AST SERPL-CCNC: 13 U/L (ref 10–35)
BASOPHILS # BLD AUTO: 83 CELLS/UL (ref 0–200)
BASOPHILS NFR BLD AUTO: 0.7 %
BILIRUB SERPL-MCNC: 0.3 MG/DL (ref 0.2–1.2)
BUN SERPL-MCNC: 13 MG/DL (ref 7–25)
CALCIUM SERPL-MCNC: 8.8 MG/DL (ref 8.6–10.2)
CHLORIDE SERPL-SCNC: 104 MMOL/L (ref 98–110)
CHOLEST SERPL-MCNC: 194 MG/DL
CHOLEST/HDLC SERPL: 4.9 (CALC)
CO2 SERPL-SCNC: 25 MMOL/L (ref 20–32)
CREAT SERPL-MCNC: 0.63 MG/DL (ref 0.5–0.99)
EGFRCR SERPLBLD CKD-EPI 2021: 110 ML/MIN/1.73M2
EOSINOPHIL # BLD AUTO: 236 CELLS/UL (ref 15–500)
EOSINOPHIL NFR BLD AUTO: 2 %
ERYTHROCYTE [DISTWIDTH] IN BLOOD BY AUTOMATED COUNT: 17.6 % (ref 11–15)
GLUCOSE SERPL-MCNC: 96 MG/DL (ref 65–99)
HCT VFR BLD AUTO: 34.9 % (ref 35–45)
HDLC SERPL-MCNC: 40 MG/DL
HGB BLD-MCNC: 10.6 G/DL (ref 11.7–15.5)
LDLC SERPL CALC-MCNC: 125 MG/DL (CALC)
LYMPHOCYTES # BLD AUTO: 3575 CELLS/UL (ref 850–3900)
LYMPHOCYTES NFR BLD AUTO: 30.3 %
MCH RBC QN AUTO: 20.2 PG (ref 27–33)
MCHC RBC AUTO-ENTMCNC: 30.4 G/DL (ref 32–36)
MCV RBC AUTO: 66.3 FL (ref 80–100)
MONOCYTES # BLD AUTO: 802 CELLS/UL (ref 200–950)
MONOCYTES NFR BLD AUTO: 6.8 %
MORPHOLOGY BLD-IMP: ABNORMAL
NEUTROPHILS # BLD AUTO: 7104 CELLS/UL (ref 1500–7800)
NEUTROPHILS NFR BLD AUTO: 60.2 %
NONHDLC SERPL-MCNC: 154 MG/DL (CALC)
PLATELET # BLD AUTO: 381 THOUSAND/UL (ref 140–400)
PMV BLD REES-ECKER: 10.1 FL (ref 7.5–12.5)
POTASSIUM SERPL-SCNC: 4.4 MMOL/L (ref 3.5–5.3)
PROT SERPL-MCNC: 6.7 G/DL (ref 6.1–8.1)
RBC # BLD AUTO: 5.26 MILLION/UL (ref 3.8–5.1)
SERVICE CMNT-IMP: ABNORMAL
SODIUM SERPL-SCNC: 138 MMOL/L (ref 135–146)
TRIGL SERPL-MCNC: 174 MG/DL
TSH SERPL-ACNC: 2.85 MIU/L
VIT B12 SERPL-MCNC: 270 PG/ML (ref 200–1100)
WBC # BLD AUTO: 11.8 THOUSAND/UL (ref 3.8–10.8)

## 2025-05-14 RX ORDER — CYANOCOBALAMIN 1000 UG/ML
1000 INJECTION, SOLUTION INTRAMUSCULAR; SUBCUTANEOUS
Status: SHIPPED | OUTPATIENT
Start: 2025-06-04 | End: 2026-04-30

## 2025-05-14 RX ORDER — ROSUVASTATIN CALCIUM 10 MG/1
10 TABLET, COATED ORAL DAILY
Qty: 100 TABLET | Refills: 0 | Status: SHIPPED | OUTPATIENT
Start: 2025-05-14 | End: 2026-06-18

## 2025-05-14 RX ORDER — CYANOCOBALAMIN 1000 UG/ML
1000 INJECTION, SOLUTION INTRAMUSCULAR; SUBCUTANEOUS
Status: SHIPPED | OUTPATIENT
Start: 2025-05-14 | End: 2025-06-04

## 2025-05-20 ENCOUNTER — APPOINTMENT (OUTPATIENT)
Dept: PRIMARY CARE | Facility: CLINIC | Age: 45
End: 2025-05-20
Payer: COMMERCIAL

## 2025-05-27 ENCOUNTER — APPOINTMENT (OUTPATIENT)
Dept: PRIMARY CARE | Facility: CLINIC | Age: 45
End: 2025-05-27
Payer: COMMERCIAL

## 2025-06-03 ENCOUNTER — APPOINTMENT (OUTPATIENT)
Dept: PRIMARY CARE | Facility: CLINIC | Age: 45
End: 2025-06-03
Payer: COMMERCIAL

## 2025-06-03 DIAGNOSIS — E53.8 VITAMIN B12 DEFICIENCY: ICD-10-CM

## 2025-06-03 PROCEDURE — 96372 THER/PROPH/DIAG INJ SC/IM: CPT | Performed by: INTERNAL MEDICINE

## 2025-06-03 RX ORDER — CYANOCOBALAMIN 1000 UG/ML
1000 INJECTION, SOLUTION INTRAMUSCULAR; SUBCUTANEOUS ONCE
Status: COMPLETED | OUTPATIENT
Start: 2025-06-03 | End: 2025-06-03

## 2025-06-03 RX ADMIN — CYANOCOBALAMIN 1000 MCG: 1000 INJECTION, SOLUTION INTRAMUSCULAR; SUBCUTANEOUS at 09:11

## 2025-06-10 ENCOUNTER — APPOINTMENT (OUTPATIENT)
Dept: CARDIOLOGY | Facility: HOSPITAL | Age: 45
End: 2025-06-10
Payer: COMMERCIAL

## 2025-06-10 ENCOUNTER — HOSPITAL ENCOUNTER (EMERGENCY)
Facility: HOSPITAL | Age: 45
Discharge: PSYCHIATRIC OR SUBSTANCE USE FACILITY | End: 2025-06-10
Attending: STUDENT IN AN ORGANIZED HEALTH CARE EDUCATION/TRAINING PROGRAM
Payer: COMMERCIAL

## 2025-06-10 ENCOUNTER — OFFICE VISIT (OUTPATIENT)
Dept: PRIMARY CARE | Facility: CLINIC | Age: 45
End: 2025-06-10
Payer: COMMERCIAL

## 2025-06-10 ENCOUNTER — HOSPITAL ENCOUNTER (INPATIENT)
Facility: HOSPITAL | Age: 45
LOS: 3 days | Discharge: HOME | End: 2025-06-13
Attending: PSYCHIATRY & NEUROLOGY | Admitting: PSYCHIATRY & NEUROLOGY
Payer: COMMERCIAL

## 2025-06-10 VITALS — SYSTOLIC BLOOD PRESSURE: 117 MMHG | DIASTOLIC BLOOD PRESSURE: 80 MMHG | OXYGEN SATURATION: 95 % | HEART RATE: 92 BPM

## 2025-06-10 VITALS
TEMPERATURE: 97.5 F | DIASTOLIC BLOOD PRESSURE: 86 MMHG | HEIGHT: 65 IN | SYSTOLIC BLOOD PRESSURE: 116 MMHG | WEIGHT: 220 LBS | BODY MASS INDEX: 36.65 KG/M2 | OXYGEN SATURATION: 97 % | RESPIRATION RATE: 18 BRPM | HEART RATE: 88 BPM

## 2025-06-10 DIAGNOSIS — B99.9 INFECTION: ICD-10-CM

## 2025-06-10 DIAGNOSIS — F33.2 SEVERE EPISODE OF RECURRENT MAJOR DEPRESSIVE DISORDER, WITHOUT PSYCHOTIC FEATURES (MULTI): ICD-10-CM

## 2025-06-10 DIAGNOSIS — R45.851 DEPRESSION WITH SUICIDAL IDEATION: ICD-10-CM

## 2025-06-10 DIAGNOSIS — F39 MOOD DISORDER: ICD-10-CM

## 2025-06-10 DIAGNOSIS — F32.A DEPRESSION WITH SUICIDAL IDEATION: ICD-10-CM

## 2025-06-10 DIAGNOSIS — E83.42 HYPOMAGNESEMIA: ICD-10-CM

## 2025-06-10 DIAGNOSIS — F20.9 SCHIZOPHRENIA, UNSPECIFIED TYPE: Primary | ICD-10-CM

## 2025-06-10 DIAGNOSIS — R45.851 SUICIDAL IDEATION: Primary | ICD-10-CM

## 2025-06-10 DIAGNOSIS — Z86.59 HISTORY OF SCHIZOPHRENIA: ICD-10-CM

## 2025-06-10 DIAGNOSIS — J45.20 MILD INTERMITTENT ASTHMA, UNCOMPLICATED (HHS-HCC): ICD-10-CM

## 2025-06-10 DIAGNOSIS — E53.8 VITAMIN B12 DEFICIENCY: ICD-10-CM

## 2025-06-10 DIAGNOSIS — F17.200 TOBACCO USE DISORDER: ICD-10-CM

## 2025-06-10 DIAGNOSIS — R45.851 VERBALIZES SUICIDAL THOUGHTS: ICD-10-CM

## 2025-06-10 LAB
ALBUMIN SERPL BCP-MCNC: 4.2 G/DL (ref 3.4–5)
ALP SERPL-CCNC: 67 U/L (ref 33–110)
ALT SERPL W P-5'-P-CCNC: 16 U/L (ref 7–45)
AMPHETAMINES UR QL SCN: ABNORMAL
ANION GAP SERPL CALCULATED.3IONS-SCNC: 11 MMOL/L (ref 10–20)
APAP SERPL-MCNC: <10 UG/ML (ref ?–30)
APPEARANCE UR: ABNORMAL
AST SERPL W P-5'-P-CCNC: 13 U/L (ref 9–39)
BACTERIA #/AREA URNS AUTO: ABNORMAL /HPF
BARBITURATES UR QL SCN: ABNORMAL
BASOPHILS # BLD AUTO: 0.08 X10*3/UL (ref 0–0.1)
BASOPHILS NFR BLD AUTO: 0.6 %
BENZODIAZ UR QL SCN: ABNORMAL
BILIRUB SERPL-MCNC: 0.4 MG/DL (ref 0–1.2)
BILIRUB UR STRIP.AUTO-MCNC: NEGATIVE MG/DL
BUN SERPL-MCNC: 14 MG/DL (ref 6–23)
BZE UR QL SCN: ABNORMAL
CALCIUM SERPL-MCNC: 9.3 MG/DL (ref 8.6–10.3)
CANNABINOIDS UR QL SCN: ABNORMAL
CHLORIDE SERPL-SCNC: 106 MMOL/L (ref 98–107)
CO2 SERPL-SCNC: 25 MMOL/L (ref 21–32)
COLOR UR: ABNORMAL
CREAT SERPL-MCNC: 0.68 MG/DL (ref 0.5–1.05)
EGFRCR SERPLBLD CKD-EPI 2021: >90 ML/MIN/1.73M*2
EOSINOPHIL # BLD AUTO: 0.22 X10*3/UL (ref 0–0.7)
EOSINOPHIL NFR BLD AUTO: 1.7 %
ERYTHROCYTE [DISTWIDTH] IN BLOOD BY AUTOMATED COUNT: 19.1 % (ref 11.5–14.5)
ETHANOL SERPL-MCNC: <10 MG/DL
FENTANYL+NORFENTANYL UR QL SCN: ABNORMAL
GLUCOSE SERPL-MCNC: 104 MG/DL (ref 74–99)
GLUCOSE UR STRIP.AUTO-MCNC: NORMAL MG/DL
HCT VFR BLD AUTO: 37.7 % (ref 36–46)
HGB BLD-MCNC: 11.2 G/DL (ref 12–16)
IMM GRANULOCYTES # BLD AUTO: 0.05 X10*3/UL (ref 0–0.7)
IMM GRANULOCYTES NFR BLD AUTO: 0.4 % (ref 0–0.9)
KETONES UR STRIP.AUTO-MCNC: NEGATIVE MG/DL
LEUKOCYTE ESTERASE UR QL STRIP.AUTO: ABNORMAL
LYMPHOCYTES # BLD AUTO: 3.93 X10*3/UL (ref 1.2–4.8)
LYMPHOCYTES NFR BLD AUTO: 30 %
MCH RBC QN AUTO: 19.4 PG (ref 26–34)
MCHC RBC AUTO-ENTMCNC: 29.7 G/DL (ref 32–36)
MCV RBC AUTO: 65 FL (ref 80–100)
METHADONE UR QL SCN: ABNORMAL
MONOCYTES # BLD AUTO: 1.19 X10*3/UL (ref 0.1–1)
MONOCYTES NFR BLD AUTO: 9.1 %
MUCOUS THREADS #/AREA URNS AUTO: ABNORMAL /LPF
NEUTROPHILS # BLD AUTO: 7.61 X10*3/UL (ref 1.2–7.7)
NEUTROPHILS NFR BLD AUTO: 58.2 %
NITRITE UR QL STRIP.AUTO: NEGATIVE
NRBC BLD-RTO: 0 /100 WBCS (ref 0–0)
OPIATES UR QL SCN: ABNORMAL
OXYCODONE+OXYMORPHONE UR QL SCN: ABNORMAL
PCP UR QL SCN: ABNORMAL
PH UR STRIP.AUTO: 6 [PH]
PLATELET # BLD AUTO: 423 X10*3/UL (ref 150–450)
POTASSIUM SERPL-SCNC: 4.6 MMOL/L (ref 3.5–5.3)
PROT SERPL-MCNC: 7.5 G/DL (ref 6.4–8.2)
PROT UR STRIP.AUTO-MCNC: ABNORMAL MG/DL
RBC # BLD AUTO: 5.77 X10*6/UL (ref 4–5.2)
RBC # UR STRIP.AUTO: ABNORMAL MG/DL
RBC #/AREA URNS AUTO: >20 /HPF
SALICYLATES SERPL-MCNC: <3 MG/DL (ref ?–20)
SODIUM SERPL-SCNC: 137 MMOL/L (ref 136–145)
SP GR UR STRIP.AUTO: 1.03
SQUAMOUS #/AREA URNS AUTO: ABNORMAL /HPF
UROBILINOGEN UR STRIP.AUTO-MCNC: NORMAL MG/DL
WBC # BLD AUTO: 13.1 X10*3/UL (ref 4.4–11.3)
WBC #/AREA URNS AUTO: ABNORMAL /HPF

## 2025-06-10 PROCEDURE — 99285 EMERGENCY DEPT VISIT HI MDM: CPT | Performed by: STUDENT IN AN ORGANIZED HEALTH CARE EDUCATION/TRAINING PROGRAM

## 2025-06-10 PROCEDURE — 80053 COMPREHEN METABOLIC PANEL: CPT | Performed by: STUDENT IN AN ORGANIZED HEALTH CARE EDUCATION/TRAINING PROGRAM

## 2025-06-10 PROCEDURE — 1140000001 HC PRIVATE PSYCH ROOM DAILY

## 2025-06-10 PROCEDURE — 99214 OFFICE O/P EST MOD 30 MIN: CPT | Performed by: INTERNAL MEDICINE

## 2025-06-10 PROCEDURE — 85025 COMPLETE CBC W/AUTO DIFF WBC: CPT | Performed by: STUDENT IN AN ORGANIZED HEALTH CARE EDUCATION/TRAINING PROGRAM

## 2025-06-10 PROCEDURE — 80307 DRUG TEST PRSMV CHEM ANLYZR: CPT | Performed by: STUDENT IN AN ORGANIZED HEALTH CARE EDUCATION/TRAINING PROGRAM

## 2025-06-10 PROCEDURE — S4991 NICOTINE PATCH NONLEGEND: HCPCS

## 2025-06-10 PROCEDURE — 2500000002 HC RX 250 W HCPCS SELF ADMINISTERED DRUGS (ALT 637 FOR MEDICARE OP, ALT 636 FOR OP/ED)

## 2025-06-10 PROCEDURE — 93005 ELECTROCARDIOGRAM TRACING: CPT

## 2025-06-10 PROCEDURE — 36415 COLL VENOUS BLD VENIPUNCTURE: CPT | Performed by: STUDENT IN AN ORGANIZED HEALTH CARE EDUCATION/TRAINING PROGRAM

## 2025-06-10 PROCEDURE — 81001 URINALYSIS AUTO W/SCOPE: CPT | Performed by: STUDENT IN AN ORGANIZED HEALTH CARE EDUCATION/TRAINING PROGRAM

## 2025-06-10 PROCEDURE — 96372 THER/PROPH/DIAG INJ SC/IM: CPT | Performed by: INTERNAL MEDICINE

## 2025-06-10 PROCEDURE — 87086 URINE CULTURE/COLONY COUNT: CPT | Mod: TRILAB | Performed by: STUDENT IN AN ORGANIZED HEALTH CARE EDUCATION/TRAINING PROGRAM

## 2025-06-10 PROCEDURE — 80143 DRUG ASSAY ACETAMINOPHEN: CPT | Performed by: STUDENT IN AN ORGANIZED HEALTH CARE EDUCATION/TRAINING PROGRAM

## 2025-06-10 PROCEDURE — 90839 PSYTX CRISIS INITIAL 60 MIN: CPT

## 2025-06-10 RX ORDER — IBUPROFEN 200 MG
1 TABLET ORAL DAILY
Status: DISCONTINUED | OUTPATIENT
Start: 2025-06-10 | End: 2025-06-10 | Stop reason: HOSPADM

## 2025-06-10 RX ORDER — IBUPROFEN 200 MG
1 TABLET ORAL DAILY
Status: DISCONTINUED | OUTPATIENT
Start: 2025-06-11 | End: 2025-06-13 | Stop reason: HOSPADM

## 2025-06-10 RX ORDER — TRAZODONE HYDROCHLORIDE 50 MG/1
50 TABLET ORAL NIGHTLY
COMMUNITY
Start: 2022-07-18 | End: 2025-06-13 | Stop reason: HOSPADM

## 2025-06-10 RX ORDER — HYDROXYZINE HYDROCHLORIDE 25 MG/1
25 TABLET, FILM COATED ORAL EVERY 6 HOURS PRN
Status: DISCONTINUED | OUTPATIENT
Start: 2025-06-10 | End: 2025-06-13 | Stop reason: HOSPADM

## 2025-06-10 RX ORDER — OLANZAPINE 10 MG/2ML
5 INJECTION, POWDER, FOR SOLUTION INTRAMUSCULAR EVERY 6 HOURS PRN
Status: DISCONTINUED | OUTPATIENT
Start: 2025-06-10 | End: 2025-06-13 | Stop reason: HOSPADM

## 2025-06-10 RX ORDER — MAGNESIUM 250 MG
1 TABLET ORAL DAILY
COMMUNITY
End: 2025-06-13 | Stop reason: HOSPADM

## 2025-06-10 RX ORDER — IBUPROFEN 200 MG
1 TABLET ORAL DAILY
Status: DISCONTINUED | OUTPATIENT
Start: 2025-06-10 | End: 2025-06-10 | Stop reason: DRUGHIGH

## 2025-06-10 RX ORDER — ALUMINUM HYDROXIDE, MAGNESIUM HYDROXIDE, AND SIMETHICONE 2400; 240; 2400 MG/30ML; MG/30ML; MG/30ML
30 SUSPENSION ORAL EVERY 6 HOURS PRN
Status: DISCONTINUED | OUTPATIENT
Start: 2025-06-10 | End: 2025-06-13 | Stop reason: HOSPADM

## 2025-06-10 RX ORDER — IBUPROFEN 200 MG
1 TABLET ORAL DAILY
Status: DISCONTINUED | OUTPATIENT
Start: 2025-07-23 | End: 2025-06-13 | Stop reason: HOSPADM

## 2025-06-10 RX ORDER — BISMUTH SUBSALICYLATE 262 MG
1 TABLET,CHEWABLE ORAL DAILY
COMMUNITY

## 2025-06-10 RX ORDER — NICOTINE 7MG/24HR
1 PATCH, TRANSDERMAL 24 HOURS TRANSDERMAL DAILY
Status: DISCONTINUED | OUTPATIENT
Start: 2025-08-06 | End: 2025-06-13 | Stop reason: HOSPADM

## 2025-06-10 RX ORDER — ACETAMINOPHEN 325 MG/1
650 TABLET ORAL EVERY 4 HOURS PRN
Status: DISCONTINUED | OUTPATIENT
Start: 2025-06-10 | End: 2025-06-13 | Stop reason: HOSPADM

## 2025-06-10 RX ORDER — ALBUTEROL SULFATE 0.83 MG/ML
2.5 SOLUTION RESPIRATORY (INHALATION) EVERY 4 HOURS PRN
Status: DISCONTINUED | OUTPATIENT
Start: 2025-06-10 | End: 2025-06-13 | Stop reason: HOSPADM

## 2025-06-10 RX ORDER — ROSUVASTATIN CALCIUM 10 MG/1
10 TABLET, COATED ORAL NIGHTLY
Status: DISCONTINUED | OUTPATIENT
Start: 2025-06-11 | End: 2025-06-13 | Stop reason: HOSPADM

## 2025-06-10 RX ORDER — CHOLECALCIFEROL (VITAMIN D3) 50 MCG
50 TABLET ORAL DAILY
Status: DISCONTINUED | OUTPATIENT
Start: 2025-06-11 | End: 2025-06-13 | Stop reason: HOSPADM

## 2025-06-10 RX ORDER — TRAZODONE HYDROCHLORIDE 50 MG/1
50 TABLET ORAL NIGHTLY PRN
Status: DISCONTINUED | OUTPATIENT
Start: 2025-06-10 | End: 2025-06-13 | Stop reason: HOSPADM

## 2025-06-10 RX ORDER — OLANZAPINE 5 MG/1
5 TABLET, FILM COATED ORAL EVERY 6 HOURS PRN
Status: DISCONTINUED | OUTPATIENT
Start: 2025-06-10 | End: 2025-06-13 | Stop reason: HOSPADM

## 2025-06-10 RX ORDER — IBUPROFEN 200 MG
1 TABLET ORAL EVERY 24 HOURS
COMMUNITY
End: 2025-06-13 | Stop reason: HOSPADM

## 2025-06-10 RX ORDER — ADHESIVE BANDAGE
30 BANDAGE TOPICAL DAILY PRN
Status: DISCONTINUED | OUTPATIENT
Start: 2025-06-10 | End: 2025-06-13 | Stop reason: HOSPADM

## 2025-06-10 RX ORDER — ALBUTEROL SULFATE 90 UG/1
2 INHALANT RESPIRATORY (INHALATION) EVERY 4 HOURS PRN
Status: DISCONTINUED | OUTPATIENT
Start: 2025-06-10 | End: 2025-06-10 | Stop reason: CLARIF

## 2025-06-10 RX ORDER — CHOLECALCIFEROL (VITAMIN D3) 50 MCG
50 TABLET ORAL DAILY
COMMUNITY

## 2025-06-10 RX ADMIN — CYANOCOBALAMIN 1000 MCG: 1000 INJECTION, SOLUTION INTRAMUSCULAR; SUBCUTANEOUS at 12:49

## 2025-06-10 RX ADMIN — NICOTINE 1 PATCH: 21 PATCH, EXTENDED RELEASE TRANSDERMAL at 15:18

## 2025-06-10 SDOH — HEALTH STABILITY: MENTAL HEALTH: ACTIVE SUICIDAL IDEATION WITH SOME INTENT TO ACT, WITHOUT SPECIFIC PLAN (PAST 1 MONTH): NO

## 2025-06-10 SDOH — HEALTH STABILITY: MENTAL HEALTH

## 2025-06-10 SDOH — HEALTH STABILITY: MENTAL HEALTH: SUICIDE ASSESSMENT: ADULT (C-SSRS)

## 2025-06-10 SDOH — HEALTH STABILITY: MENTAL HEALTH: ARE YOU HAVING THOUGHTS OF KILLING YOURSELF RIGHT NOW?: NO

## 2025-06-10 SDOH — HEALTH STABILITY: MENTAL HEALTH: NON-SPECIFIC ACTIVE SUICIDAL THOUGHTS (PAST 1 MONTH): YES

## 2025-06-10 SDOH — HEALTH STABILITY: MENTAL HEALTH: HAVE YOU HAD THESE THOUGHTS AND HAD SOME INTENTION OF ACTING ON THEM?: NO

## 2025-06-10 SDOH — HEALTH STABILITY: MENTAL HEALTH: SUICIDAL BEHAVIOR (LIFETIME): NO

## 2025-06-10 SDOH — HEALTH STABILITY: MENTAL HEALTH: HAVE YOU ACTUALLY HAD ANY THOUGHTS OF KILLING YOURSELF?: YES

## 2025-06-10 SDOH — HEALTH STABILITY: MENTAL HEALTH: CONTENT: UNREMARKABLE

## 2025-06-10 SDOH — HEALTH STABILITY: MENTAL HEALTH: HAVE YOU WISHED YOU WERE DEAD OR WISHED YOU COULD GO TO SLEEP AND NOT WAKE UP?: YES

## 2025-06-10 SDOH — HEALTH STABILITY: MENTAL HEALTH: IN THE PAST FEW WEEKS, HAVE YOU FELT THAT YOU OR YOUR FAMILY WOULD BE BETTER OFF IF YOU WERE DEAD?: YES

## 2025-06-10 SDOH — HEALTH STABILITY: MENTAL HEALTH: DEPRESSION SYMPTOMS: FEELINGS OF HELPLESSNESS;FEELINGS OF HOPELESSESS;FEELINGS OF WORTHLESSNESS;CRYING;SLEEP DISTURBANCE

## 2025-06-10 SDOH — HEALTH STABILITY: MENTAL HEALTH: WISH TO BE DEAD (PAST 1 MONTH): YES

## 2025-06-10 SDOH — HEALTH STABILITY: MENTAL HEALTH: IN THE PAST WEEK, HAVE YOU BEEN HAVING THOUGHTS ABOUT KILLING YOURSELF?: NO

## 2025-06-10 SDOH — HEALTH STABILITY: MENTAL HEALTH: ACTIVE SUICIDAL IDEATION WITH SPECIFIC PLAN AND INTENT (PAST 1 MONTH): NO

## 2025-06-10 SDOH — HEALTH STABILITY: MENTAL HEALTH
HAVE YOU STARTED TO WORK OUT OR WORKED OUT THE DETAILS OF HOW TO KILL YOURSELF? DO YOU INTENT TO CARRY OUT THIS PLAN?: NO

## 2025-06-10 SDOH — ECONOMIC STABILITY: GENERAL

## 2025-06-10 SDOH — HEALTH STABILITY: MENTAL HEALTH: FOR HIGH RISK PATIENTS: 1:1 PATIENT OBSERVER AT ALL TIMES

## 2025-06-10 SDOH — HEALTH STABILITY: MENTAL HEALTH: HAVE YOU EVER DONE ANYTHING, STARTED TO DO ANYTHING, OR PREPARED TO DO ANYTHING TO END YOUR LIFE?: NO

## 2025-06-10 SDOH — HEALTH STABILITY: MENTAL HEALTH: HAVE YOU EVER TRIED TO KILL YOURSELF?: NO

## 2025-06-10 SDOH — HEALTH STABILITY: MENTAL HEALTH: ANXIETY SYMPTOMS: GENERALIZED

## 2025-06-10 SDOH — HEALTH STABILITY: MENTAL HEALTH: BEHAVIORS/MOOD: CRYING;SAD;TEARFUL

## 2025-06-10 SDOH — HEALTH STABILITY: MENTAL HEALTH: IN THE PAST FEW WEEKS, HAVE YOU WISHED YOU WERE DEAD?: YES

## 2025-06-10 SDOH — HEALTH STABILITY: MENTAL HEALTH: HAVE YOU BEEN THINKING ABOUT HOW YOU MIGHT DO THIS?: YES

## 2025-06-10 SDOH — ECONOMIC STABILITY: HOUSING INSECURITY: FEELS SAFE LIVING IN HOME: YES

## 2025-06-10 SDOH — HEALTH STABILITY: MENTAL HEALTH: BEHAVIORAL HEALTH(WDL): EXCEPTIONS TO WDL

## 2025-06-10 ASSESSMENT — ENCOUNTER SYMPTOMS
DECREASED CONCENTRATION: 1
NERVOUS/ANXIOUS: 1
DYSPHORIC MOOD: 1
SLEEP DISTURBANCE: 1

## 2025-06-10 ASSESSMENT — LIFESTYLE VARIABLES
TOTAL_SCORE: 2
PRESCIPTION_ABUSE_PAST_12_MONTHS: NO
SUBSTANCE_ABUSE_PAST_12_MONTHS: YES

## 2025-06-10 ASSESSMENT — COLUMBIA-SUICIDE SEVERITY RATING SCALE - C-SSRS
4. HAVE YOU HAD THESE THOUGHTS AND HAD SOME INTENTION OF ACTING ON THEM?: NO
5. HAVE YOU STARTED TO WORK OUT OR WORKED OUT THE DETAILS OF HOW TO KILL YOURSELF? DO YOU INTEND TO CARRY OUT THIS PLAN?: NO
2. HAVE YOU ACTUALLY HAD ANY THOUGHTS OF KILLING YOURSELF?: YES
6. HAVE YOU EVER DONE ANYTHING, STARTED TO DO ANYTHING, OR PREPARED TO DO ANYTHING TO END YOUR LIFE?: NO
1. IN THE PAST MONTH, HAVE YOU WISHED YOU WERE DEAD OR WISHED YOU COULD GO TO SLEEP AND NOT WAKE UP?: YES

## 2025-06-10 ASSESSMENT — PAIN SCALES - GENERAL
PAINLEVEL_OUTOF10: 0 - NO PAIN
PAINLEVEL_OUTOF10: 0 - NO PAIN

## 2025-06-10 ASSESSMENT — PAIN - FUNCTIONAL ASSESSMENT
PAIN_FUNCTIONAL_ASSESSMENT: 0-10
PAIN_FUNCTIONAL_ASSESSMENT: 0-10
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
## 2025-06-10 NOTE — PROGRESS NOTES
Pharmacy Medication History Review    Karen Ordonez is a 45 y.o. female admitted for Psych consult. Pharmacy reviewed the patient's [id number] medications and allergies for accuracy.    Medications ADDED:  Vitamin D-3 50 mcg  Cyanocobalamin 1,000 mcg  Medications CHANGED:  Nicotine 14 mg/24hr -> 21 mg/24hr TD patches  Medications REMOVED:   Pantoprazole (not taking)    The list below reflects the updated PTA list.      Prior to Admission Medications   Prescriptions Last Dose Informant Patient Reported? Taking?   albuterol (Ventolin HFA) 90 mcg/actuation inhaler Past Week  No Yes   Sig: Inhale 2 puffs every 4 hours if needed for wheezing or shortness of breath.   cholecalciferol (Vitamin D-3) 50 mcg (2,000 units) tablet Past Week Self Yes Yes   Sig: Take 1 tablet (50 mcg) by mouth once daily.   magnesium 250 mg tablet 6/9/2025 Evening  Yes Yes   Sig: Take 1 tablet (250 mg) by mouth once daily.   multivitamin tablet 6/9/2025  Yes Yes   Sig: Take 1 tablet by mouth once daily.   nicotine (Nicoderm CQ) 14 mg/24 hr patch   No No   Sig: After finishing the 21mg start the 14mg x 14 days.   Patient not taking: Reported on 6/10/2025   nicotine (Nicoderm CQ) 21 mg/24 hr patch Past Week  Yes Yes   Sig: Place 1 patch on the skin once every 24 hours.   rosuvastatin (Crestor) 10 mg tablet 6/9/2025 Evening  No Yes   Sig: Take 1 tablet (10 mg) by mouth once daily.   Patient taking differently: Take 1 tablet (10 mg) by mouth once daily at bedtime.   traZODone (Desyrel) 50 mg tablet   Yes No   Sig: Take 1 tablet (50 mg) by mouth once daily at bedtime.      Facility-Administered Medications Last Administration Doses Remaining   cyanocobalamin (Vitamin B-12) injection 1,000 mcg 6/10/2025 12:49 PM 10           The list below reflects the updated allergy list. Please review each documented allergy for additional clarification and justification.  Allergies  Reviewed by Bijan Richards MD on 6/10/2025        Severity Reactions  Comments    Tuna Oil High Anaphylaxis     Codeine Hcl Low Itching     Latex Low Rash     Morphine Low Itching            Patient accepts M2B at discharge.     Sources:   Patient Interview Good historian     Additional Comments:  N/A      Narcisa Bedoya  Pharmacy Technician  06/10/25

## 2025-06-10 NOTE — ED TRIAGE NOTES
Patient brought in by police for suicidal ideation. Patient states she has a lot going on and worries about financials at home. Patient states she went to her docs office today and made a comment about jumping off a bridge because she was depressed and stressed. Patient states she doesn't want to die but also has had these thoughts on and off for a few years. Patient states she has had a plan in the past but has never acted on it or went through with anything. Patient states she does not take any meds or see any counselors at this time but wants to talk to someone. Patient has no other complaints
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
## 2025-06-10 NOTE — PROGRESS NOTES
I reviewed the patient's medical workup.  She is medically cleared for admission psychiatric evaluation and management.  She has 4+ bacteria in the urine but this is contaminated with squamous cells, will be sent for culture.  Patient will be contacted if she needs to start antibiotics if the culture is positive.  I feel the risk of treating this with antibiotics outweighs the benefit at this time given lack of urinary symptoms otherwise.

## 2025-06-10 NOTE — ED PROVIDER NOTES
HPI   Chief Complaint   Patient presents with    Suicidal       HPI  Patient is a 45-year-old female with history of schizophrenia, depression, anxiety referred to ER by her psychiatrist for suicidal ideation.  Patient further reports increasing thoughts of wanting to hurt herself over the past few days.  Reports on Saturday she was in [place] and thought about jumping off the [place] of [employer] in order to end her life.  She reports that she has a lot of anxiety surrounding her family situation and is concerned and constantly paranoid that her family is upset with her and that she is not doing enough.  She states that she has stopped taking Seroquel because she does not like that it makes her sleep frequently.  States she has not hurt herself.  Does admit to using cocaine 3 days ago.  Otherwise denies any drug use today.  She has no other acute complaints.  Denies any physical complaints at this time.      Patient History   Medical History[1]  Surgical History[2]  Family History[3]  Social History[4]    Physical Exam   ED Triage Vitals   Temp Pulse Resp BP   -- -- -- --      SpO2 Temp src Heart Rate Source Patient Position   -- -- -- --      BP Location FiO2 (%)     -- --       Physical Exam  Vitals and nursing note reviewed.   Constitutional:       General: She is not in acute distress.     Appearance: She is well-developed.   HENT:      Head: Normocephalic and atraumatic.   Eyes:      Conjunctiva/sclera: Conjunctivae normal.   Cardiovascular:      Rate and Rhythm: Normal rate and regular rhythm.      Heart sounds: No murmur heard.  Pulmonary:      Effort: Pulmonary effort is normal. No respiratory distress.      Breath sounds: Normal breath sounds.   Abdominal:      Palpations: Abdomen is soft.      Tenderness: There is no abdominal tenderness.   Musculoskeletal:         General: No swelling.      Cervical back: Neck supple.   Skin:     General: Skin is warm and dry.      Capillary Refill: Capillary refill takes  less than 2 seconds.   Neurological:      Mental Status: She is alert.   Psychiatric:         Mood and Affect: Mood normal.           ED Course & MDM   ED Course as of 06/10/25 1704   Tue Maurice 10, 2025   1349 EKG shows NSR, HR 92, Qtc 460 [JW]      ED Course User Index  [JW] Bentley Hart MD         Diagnoses as of 06/10/25 1704   Suicidal ideation   History of schizophrenia                 No data recorded     [place] Coma Scale Score: 15 (06/10/25 1321 : Janae Pereira RN)                           Medical Decision Making  Parts of this chart have been completed using voice recognition software. Please excuse any errors of transcription.  My thought process and reason for plan has been formulated from the time that I saw the patient until the time of disposition and is not specific to one specific moment during their visit and furthermore my MDM encompasses this entire chart and not only this text box.      HPI: Detailed above.    Exam: A medically appropriate exam performed, outlined above, given the known history and presentation.    History obtained from: Patient, chart review    EKG: Interpreted by attending physician and reviewed by me    Medications given during visit:  Medications   nicotine (Nicoderm CQ) 21 mg/24 hr patch 1 patch (1 patch transdermal Medication Applied 6/10/25 7956)        Diagnostic/tests  Labs Reviewed   CBC WITH AUTO DIFFERENTIAL - Abnormal       Result Value    WBC 13.1 (*)     nRBC 0.0      RBC 5.77 (*)     Hemoglobin 11.2 (*)     Hematocrit 37.7      MCV 65 (*)     MCH 19.4 (*)     MCHC 29.7 (*)     RDW 19.1 (*)     Platelets 423      Neutrophils % 58.2      Immature Granulocytes %, Automated 0.4      Lymphocytes % 30.0      Monocytes % 9.1      Eosinophils % 1.7      Basophils % 0.6      Neutrophils Absolute 7.61      Immature Granulocytes Absolute, Automated 0.05      Lymphocytes Absolute 3.93      Monocytes Absolute 1.19 (*)     Eosinophils Absolute 0.22      Basophils  Absolute 0.08     DRUG SCREEN,URINE - Abnormal    Amphetamine Screen, Urine Presumptive Negative      Barbiturate Screen, Urine Presumptive Negative      Benzodiazepines Screen, Urine Presumptive Negative      Cannabinoid Screen, Urine Presumptive Negative      Cocaine Metabolite Screen, Urine Presumptive Positive (*)     Fentanyl Screen, Urine Presumptive Negative      Opiate Screen, Urine Presumptive Negative      Oxycodone Screen, Urine Presumptive Negative      PCP Screen, Urine Presumptive Negative      Methadone Screen, Urine Presumptive Negative      Narrative:     Drug screen results are presumptive and should not be used to assess   compliance with prescribed medication. Contact the performing [facility] laboratory   to add-on definitive confirmatory testing if clinically indicated.    Toxicology screening results are reported qualitatively. The concentration must   be greater than or equal to the cutoff to be reported as positive. The concentration   at which the screening test can detect an individual drug or metabolite varies.   The absence of expected drug(s) and/or drug metabolite(s) may indicate non-compliance,   inappropriate timing of specimen collection relative to drug administration, poor drug   absorption, diluted/adulterated urine, or limitations of testing. For medical purposes   only; not valid for forensic use.    Interpretive questions should be directed to the laboratory medical directors.   COMPREHENSIVE METABOLIC PANEL - Abnormal    Glucose 104 (*)     Sodium 137      Potassium 4.6      Chloride 106      Bicarbonate 25      Anion Gap 11      Urea Nitrogen 14      Creatinine 0.68      eGFR >90      Calcium 9.3      Albumin 4.2      Alkaline Phosphatase 67      Total Protein 7.5      AST 13      Bilirubin, Total 0.4      ALT 16     URINALYSIS WITH REFLEX CULTURE AND MICROSCOPIC - Abnormal    Color, Urine Light-Orange (*)     Appearance, Urine Ex.Turbid (*)     Specific Gravity, Urine 1.027       pH, Urine 6.0      Protein, Urine 50 (1+) (*)     Glucose, Urine Normal      Blood, Urine OVER (3+) (*)     Ketones, Urine NEGATIVE      Bilirubin, Urine NEGATIVE      Urobilinogen, Urine Normal      Nitrite, Urine NEGATIVE      Leukocyte Esterase, Urine 25 Dionte/uL (*)     Narrative:     OVER is reported when the result is greater than the clinically reportable range.   MICROSCOPIC ONLY, URINE - Abnormal    WBC, Urine 1-5      RBC, Urine >20 (*)     Squamous Epithelial Cells, Urine 10-25 (FEW)      Bacteria, Urine 4+ (*)     Mucus, Urine 3+     ACUTE TOXICOLOGY PANEL, BLOOD - Normal    Acetaminophen <10.0      Salicylate  <3      Alcohol <10     URINE CULTURE   URINALYSIS WITH REFLEX CULTURE AND MICROSCOPIC    Narrative:     The following orders were created for panel order Urinalysis with Reflex Culture and Microscopic.  Procedure                               Abnormality         Status                     ---------                               -----------         ------                     Urinalysis with Reflex C...[[id number]]  Abnormal            Final result               Extra Urine Gray Tube[[id number]]                                                         Please view results for these tests on the individual orders.   EXTRA URINE GRAY TUBE      No orders to display        Considerations/further MDM:  Patient is a 45-year-old female brought in by police pink slipped by psychiatry for suicidal ideation.  Patient reported to her psychiatrist that she had a plan to jump off the bridge at the [facility] in [place] and attempt to end her life.  Given that she was pink slipped.  Patient is calm and cooperative during the ER visit.  Thoughts appear to be somewhat disorganized.  Denies auditory or visual hallucinations.  She has no physical complaints.  Laboratory workup with mild leukocytosis, chronic anemia, evidence of cocaine use but otherwise unremarkable.  Urinalysis is with 25 leukocyte esterase and plus for  bacteria but patient has no urinary symptoms at this time to suggest acute urinary tract infection.  Will follow-up on urine culture.  She is felt to require inpatient psychiatric hospitalization given her suicidal ideation, pending placement per social work.  It has reached the end of my shift and results are still pending.  From this point onward patient care will be given by Dr. Ashley.  Please refer to his note for additional details regarding the remainder of the patient visit and disposition.        Procedure  Procedures       [1]   Past Medical History:  Diagnosis Date    PTSD (post-traumatic stress disorder)     Schizophrenia     Severe depression (Multi)    [2]   Past Surgical History:  Procedure Laterality Date    OTHER SURGICAL HISTORY  07/18/2022    Knee surgery    OTHER SURGICAL HISTORY  09/27/2022    Tonsillectomy with adenoidectomy    OTHER SURGICAL HISTORY  09/27/2022    Ear pressure equalization tube insertion   [3] No family history on file.  [4]   Social History  Tobacco Use    Smoking status: Every Day     Current packs/day: 0.50     Types: Cigarettes    Smokeless tobacco: Never   Vaping Use    Vaping status: Never Used   Substance Use Topics    Alcohol use: Not Currently    Drug use: Yes     Types: Marijuana        Niecy Stinson PA-C  06/10/25 7692

## 2025-06-11 PROBLEM — E78.2 MIXED HYPERLIPIDEMIA: Status: ACTIVE | Noted: 2025-06-11

## 2025-06-11 PROBLEM — E55.9 VITAMIN D DEFICIENCY: Status: ACTIVE | Noted: 2025-06-11

## 2025-06-11 PROBLEM — H66.11 CHRONIC TUBOTYMPANIC SUPPURATIVE OTITIS MEDIA OF RIGHT EAR: Status: ACTIVE | Noted: 2025-06-11

## 2025-06-11 PROBLEM — R03.0 ELEVATED BLOOD PRESSURE READING: Status: ACTIVE | Noted: 2025-06-11

## 2025-06-11 LAB
ATRIAL RATE: 92 BPM
EST. AVERAGE GLUCOSE BLD GHB EST-MCNC: 111 MG/DL
HBA1C MFR BLD: 5.5 % (ref ?–5.7)
P AXIS: -24 DEGREES
P OFFSET: 196 MS
P ONSET: 143 MS
PR INTERVAL: 150 MS
Q ONSET: 218 MS
QRS COUNT: 15 BEATS
QRS DURATION: 84 MS
QT INTERVAL: 372 MS
QTC CALCULATION(BAZETT): 460 MS
QTC FREDERICIA: 429 MS
R AXIS: 54 DEGREES
T AXIS: 30 DEGREES
T OFFSET: 404 MS
VENTRICULAR RATE: 92 BPM

## 2025-06-11 PROCEDURE — 2500000001 HC RX 250 WO HCPCS SELF ADMINISTERED DRUGS (ALT 637 FOR MEDICARE OP): Performed by: INTERNAL MEDICINE

## 2025-06-11 PROCEDURE — 2500000001 HC RX 250 WO HCPCS SELF ADMINISTERED DRUGS (ALT 637 FOR MEDICARE OP): Performed by: PSYCHIATRY & NEUROLOGY

## 2025-06-11 PROCEDURE — 2500000002 HC RX 250 W HCPCS SELF ADMINISTERED DRUGS (ALT 637 FOR MEDICARE OP, ALT 636 FOR OP/ED): Performed by: INTERNAL MEDICINE

## 2025-06-11 PROCEDURE — 83036 HEMOGLOBIN GLYCOSYLATED A1C: CPT | Mod: TRILAB | Performed by: PSYCHIATRY & NEUROLOGY

## 2025-06-11 PROCEDURE — S4991 NICOTINE PATCH NONLEGEND: HCPCS | Performed by: PSYCHIATRY & NEUROLOGY

## 2025-06-11 PROCEDURE — 99222 1ST HOSP IP/OBS MODERATE 55: CPT | Performed by: PSYCHIATRY & NEUROLOGY

## 2025-06-11 PROCEDURE — 36415 COLL VENOUS BLD VENIPUNCTURE: CPT | Performed by: PSYCHIATRY & NEUROLOGY

## 2025-06-11 PROCEDURE — 2500000004 HC RX 250 GENERAL PHARMACY W/ HCPCS (ALT 636 FOR OP/ED): Performed by: INTERNAL MEDICINE

## 2025-06-11 PROCEDURE — 1140000001 HC PRIVATE PSYCH ROOM DAILY

## 2025-06-11 PROCEDURE — 2500000002 HC RX 250 W HCPCS SELF ADMINISTERED DRUGS (ALT 637 FOR MEDICARE OP, ALT 636 FOR OP/ED): Performed by: PSYCHIATRY & NEUROLOGY

## 2025-06-11 PROCEDURE — 99254 IP/OBS CNSLTJ NEW/EST MOD 60: CPT | Performed by: INTERNAL MEDICINE

## 2025-06-11 RX ORDER — QUETIAPINE FUMARATE 25 MG/1
25 TABLET, FILM COATED ORAL NIGHTLY
Status: DISCONTINUED | OUTPATIENT
Start: 2025-06-11 | End: 2025-06-12

## 2025-06-11 RX ORDER — ESCITALOPRAM OXALATE 10 MG/1
10 TABLET ORAL DAILY
Status: DISCONTINUED | OUTPATIENT
Start: 2025-06-12 | End: 2025-06-13 | Stop reason: HOSPADM

## 2025-06-11 RX ORDER — LANOLIN ALCOHOL/MO/W.PET/CERES
400 CREAM (GRAM) TOPICAL ONCE
Status: DISCONTINUED | OUTPATIENT
Start: 2025-06-11 | End: 2025-06-11

## 2025-06-11 RX ORDER — LANOLIN ALCOHOL/MO/W.PET/CERES
400 CREAM (GRAM) TOPICAL DAILY
Status: DISCONTINUED | OUTPATIENT
Start: 2025-06-11 | End: 2025-06-13 | Stop reason: HOSPADM

## 2025-06-11 RX ORDER — ESCITALOPRAM OXALATE 5 MG/1
5 TABLET ORAL ONCE
Status: COMPLETED | OUTPATIENT
Start: 2025-06-11 | End: 2025-06-11

## 2025-06-11 RX ADMIN — Medication 50 MCG: at 08:23

## 2025-06-11 RX ADMIN — Medication 1 TABLET: at 11:57

## 2025-06-11 RX ADMIN — NICOTINE 1 PATCH: 21 PATCH, EXTENDED RELEASE TRANSDERMAL at 08:23

## 2025-06-11 RX ADMIN — AMOXICILLIN 750 MG: 500 CAPSULE ORAL at 16:59

## 2025-06-11 RX ADMIN — TRAZODONE HYDROCHLORIDE 50 MG: 50 TABLET ORAL at 21:00

## 2025-06-11 RX ADMIN — ROSUVASTATIN 10 MG: 10 TABLET, FILM COATED ORAL at 21:01

## 2025-06-11 RX ADMIN — ESCITALOPRAM 5 MG: 5 TABLET, FILM COATED ORAL at 14:41

## 2025-06-11 RX ADMIN — QUETIAPINE FUMARATE 25 MG: 25 TABLET ORAL at 21:00

## 2025-06-11 SDOH — SOCIAL STABILITY: SOCIAL NETWORK: IN A TYPICAL WEEK, HOW MANY TIMES DO YOU TALK ON THE PHONE WITH FAMILY, FRIENDS, OR NEIGHBORS?: PATIENT UNABLE TO ANSWER

## 2025-06-11 SDOH — HEALTH STABILITY: PHYSICAL HEALTH
HOW OFTEN DO YOU NEED TO HAVE SOMEONE HELP YOU WHEN YOU READ INSTRUCTIONS, PAMPHLETS, OR OTHER WRITTEN MATERIAL FROM YOUR DOCTOR OR PHARMACY?: NEVER

## 2025-06-11 SDOH — SOCIAL STABILITY: SOCIAL INSECURITY: DO YOU FEEL UNSAFE GOING BACK TO THE PLACE WHERE YOU ARE LIVING?: NO

## 2025-06-11 SDOH — SOCIAL STABILITY: SOCIAL INSECURITY: WITHIN THE LAST YEAR, HAVE YOU BEEN AFRAID OF YOUR PARTNER OR EX-PARTNER?: PATIENT UNABLE TO ANSWER

## 2025-06-11 SDOH — ECONOMIC STABILITY: HOUSING INSECURITY
IN THE LAST 12 MONTHS, WAS THERE A TIME WHEN YOU WERE NOT ABLE TO PAY THE MORTGAGE OR RENT ON TIME?: PATIENT UNABLE TO ANSWER

## 2025-06-11 SDOH — SOCIAL STABILITY: SOCIAL NETWORK: HOW OFTEN DO YOU ATTEND CHURCH OR RELIGIOUS SERVICES?: PATIENT UNABLE TO ANSWER

## 2025-06-11 SDOH — SOCIAL STABILITY: SOCIAL INSECURITY: DO YOU FEEL ANYONE HAS EXPLOITED OR TAKEN ADVANTAGE OF YOU FINANCIALLY OR OF YOUR PERSONAL PROPERTY?: NO

## 2025-06-11 SDOH — SOCIAL STABILITY: SOCIAL INSECURITY: ARE YOU MARRIED, WIDOWED, DIVORCED, SEPARATED, NEVER MARRIED, OR LIVING WITH A PARTNER?: PATIENT UNABLE TO ANSWER

## 2025-06-11 SDOH — HEALTH STABILITY: PHYSICAL HEALTH: ON AVERAGE, HOW MANY MINUTES DO YOU ENGAGE IN EXERCISE AT THIS LEVEL?: PATIENT UNABLE TO ANSWER

## 2025-06-11 SDOH — HEALTH STABILITY: MENTAL HEALTH
DO YOU FEEL STRESS - TENSE, RESTLESS, NERVOUS, OR ANXIOUS, OR UNABLE TO SLEEP AT NIGHT BECAUSE YOUR MIND IS TROUBLED ALL THE TIME - THESE DAYS?: PATIENT UNABLE TO ANSWER

## 2025-06-11 SDOH — HEALTH STABILITY: PHYSICAL HEALTH
ON AVERAGE, HOW MANY DAYS PER WEEK DO YOU ENGAGE IN MODERATE TO STRENUOUS EXERCISE (LIKE A BRISK WALK)?: PATIENT UNABLE TO ANSWER

## 2025-06-11 SDOH — SOCIAL STABILITY: SOCIAL INSECURITY: HAS ANYONE EVER THREATENED TO HURT YOUR FAMILY OR YOUR PETS?: NO

## 2025-06-11 SDOH — SOCIAL STABILITY: SOCIAL INSECURITY: HAVE YOU HAD THOUGHTS OF HARMING ANYONE ELSE?: NO

## 2025-06-11 SDOH — SOCIAL STABILITY: SOCIAL INSECURITY
WITHIN THE LAST YEAR, HAVE YOU BEEN KICKED, HIT, SLAPPED, OR OTHERWISE PHYSICALLY HURT BY YOUR PARTNER OR EX-PARTNER?: PATIENT UNABLE TO ANSWER

## 2025-06-11 SDOH — ECONOMIC STABILITY: FOOD INSECURITY
WITHIN THE PAST 12 MONTHS, THE FOOD YOU BOUGHT JUST DIDN'T LAST AND YOU DIDN'T HAVE MONEY TO GET MORE.: PATIENT UNABLE TO ANSWER

## 2025-06-11 SDOH — HEALTH STABILITY: MENTAL HEALTH: EXPERIENCED ANY OF THE FOLLOWING LIFE EVENTS: CHILDHOOD NEGLECT;SOCIAL LOSS (BANKRUPTCY, DIVORCE, WORK-RELATED STRESS)

## 2025-06-11 SDOH — HEALTH STABILITY: MENTAL HEALTH: HOW OFTEN DO YOU HAVE A DRINK CONTAINING ALCOHOL?: PATIENT UNABLE TO ANSWER

## 2025-06-11 SDOH — ECONOMIC STABILITY: HOUSING INSECURITY: AT ANY TIME IN THE PAST 12 MONTHS, WERE YOU HOMELESS OR LIVING IN A SHELTER (INCLUDING NOW)?: PATIENT UNABLE TO ANSWER

## 2025-06-11 SDOH — ECONOMIC STABILITY: FOOD INSECURITY
HOW HARD IS IT FOR YOU TO PAY FOR THE VERY BASICS LIKE FOOD, HOUSING, MEDICAL CARE, AND HEATING?: PATIENT UNABLE TO ANSWER

## 2025-06-11 SDOH — SOCIAL STABILITY: SOCIAL NETWORK: HOW OFTEN DO YOU GET TOGETHER WITH FRIENDS OR RELATIVES?: PATIENT UNABLE TO ANSWER

## 2025-06-11 SDOH — ECONOMIC STABILITY: FOOD INSECURITY
WITHIN THE PAST 12 MONTHS, YOU WORRIED THAT YOUR FOOD WOULD RUN OUT BEFORE YOU GOT THE MONEY TO BUY MORE.: PATIENT UNABLE TO ANSWER

## 2025-06-11 SDOH — ECONOMIC STABILITY: HOUSING INSECURITY: IN THE PAST 12 MONTHS, HOW MANY TIMES HAVE YOU MOVED WHERE YOU WERE LIVING?: 0

## 2025-06-11 SDOH — SOCIAL STABILITY: SOCIAL INSECURITY: ABUSE: ADULT

## 2025-06-11 SDOH — HEALTH STABILITY: MENTAL HEALTH: HOW OFTEN DO YOU HAVE SIX OR MORE DRINKS ON ONE OCCASION?: PATIENT UNABLE TO ANSWER

## 2025-06-11 SDOH — SOCIAL STABILITY: SOCIAL NETWORK: HOW OFTEN DO YOU ATTEND MEETINGS OF THE CLUBS OR ORGANIZATIONS YOU BELONG TO?: PATIENT UNABLE TO ANSWER

## 2025-06-11 SDOH — SOCIAL STABILITY: SOCIAL INSECURITY: WERE YOU ABLE TO COMPLETE ALL THE BEHAVIORAL HEALTH SCREENINGS?: NO

## 2025-06-11 SDOH — HEALTH STABILITY: MENTAL HEALTH: HOW MANY DRINKS CONTAINING ALCOHOL DO YOU HAVE ON A TYPICAL DAY WHEN YOU ARE DRINKING?: PATIENT UNABLE TO ANSWER

## 2025-06-11 SDOH — SOCIAL STABILITY: SOCIAL INSECURITY: ARE YOU OR HAVE YOU BEEN THREATENED OR ABUSED PHYSICALLY, EMOTIONALLY, OR SEXUALLY BY ANYONE?: NO

## 2025-06-11 SDOH — ECONOMIC STABILITY: INCOME INSECURITY
IN THE PAST 12 MONTHS HAS THE ELECTRIC, GAS, OIL, OR WATER COMPANY THREATENED TO SHUT OFF SERVICES IN YOUR HOME?: PATIENT UNABLE TO ANSWER

## 2025-06-11 SDOH — SOCIAL STABILITY: SOCIAL INSECURITY
WITHIN THE LAST YEAR, HAVE YOU BEEN RAPED OR FORCED TO HAVE ANY KIND OF SEXUAL ACTIVITY BY YOUR PARTNER OR EX-PARTNER?: PATIENT UNABLE TO ANSWER

## 2025-06-11 SDOH — SOCIAL STABILITY: SOCIAL NETWORK
DO YOU BELONG TO ANY CLUBS OR ORGANIZATIONS SUCH AS CHURCH GROUPS, UNIONS, FRATERNAL OR ATHLETIC GROUPS, OR SCHOOL GROUPS?: PATIENT UNABLE TO ANSWER

## 2025-06-11 SDOH — SOCIAL STABILITY: SOCIAL INSECURITY: DOES ANYONE TRY TO KEEP YOU FROM HAVING/CONTACTING OTHER FRIENDS OR DOING THINGS OUTSIDE YOUR HOME?: NO

## 2025-06-11 SDOH — SOCIAL STABILITY: SOCIAL INSECURITY: ARE THERE ANY APPARENT SIGNS OF INJURIES/BEHAVIORS THAT COULD BE RELATED TO ABUSE/NEGLECT?: NO

## 2025-06-11 SDOH — ECONOMIC STABILITY: TRANSPORTATION INSECURITY
IN THE PAST 12 MONTHS, HAS LACK OF TRANSPORTATION KEPT YOU FROM MEDICAL APPOINTMENTS OR FROM GETTING MEDICATIONS?: PATIENT UNABLE TO ANSWER

## 2025-06-11 SDOH — SOCIAL STABILITY: SOCIAL INSECURITY
WITHIN THE LAST YEAR, HAVE YOU BEEN HUMILIATED OR EMOTIONALLY ABUSED IN OTHER WAYS BY YOUR PARTNER OR EX-PARTNER?: PATIENT UNABLE TO ANSWER

## 2025-06-11 SDOH — SOCIAL STABILITY: SOCIAL INSECURITY: WERE YOU ABLE TO COMPLETE ALL THE BEHAVIORAL HEALTH SCREENINGS?: YES

## 2025-06-11 SDOH — SOCIAL STABILITY: SOCIAL INSECURITY: HAVE YOU HAD ANY THOUGHTS OF HARMING ANYONE ELSE?: YES

## 2025-06-11 ASSESSMENT — LIFESTYLE VARIABLES
SKIP TO QUESTIONS 9-10: 0
AUDIT-C TOTAL SCORE: -1
HEADACHE, FULLNESS IN HEAD: NOT PRESENT
HOW OFTEN DO YOU HAVE A DRINK CONTAINING ALCOHOL: PATIENT UNABLE TO ANSWER
AUDITORY DISTURBANCES: NOT PRESENT
AGITATION: NORMAL ACTIVITY
AUDIT-C TOTAL SCORE: -1
SKIP TO QUESTIONS 9-10: 0
HOW OFTEN DO YOU HAVE 6 OR MORE DRINKS ON ONE OCCASION: PATIENT UNABLE TO ANSWER
NAUSEA AND VOMITING: NO NAUSEA AND NO VOMITING
CIWA TOTAL SCORE: 1
ORIENTATION AND CLOUDING OF SENSORIUM: ORIENTED AND CAN DO SERIAL ADDITIONS
VISUAL DISTURBANCES: NOT PRESENT
HOW MANY STANDARD DRINKS CONTAINING ALCOHOL DO YOU HAVE ON A TYPICAL DAY: PATIENT UNABLE TO ANSWER
TREMOR: NO TREMOR
PRESCIPTION_ABUSE_PAST_12_MONTHS: NO
SUBSTANCE_ABUSE_PAST_12_MONTHS: YES
PAROXYSMAL SWEATS: NO SWEAT VISIBLE
AUDIT-C TOTAL SCORE: -1
ANXIETY: MILDLY ANXIOUS

## 2025-06-11 ASSESSMENT — COLUMBIA-SUICIDE SEVERITY RATING SCALE - C-SSRS
1. IN THE PAST MONTH, HAVE YOU WISHED YOU WERE DEAD OR WISHED YOU COULD GO TO SLEEP AND NOT WAKE UP?: YES
4. HAVE YOU HAD THESE THOUGHTS AND HAD SOME INTENTION OF ACTING ON THEM?: NO
2. HAVE YOU ACTUALLY HAD ANY THOUGHTS OF KILLING YOURSELF?: YES
5. HAVE YOU STARTED TO WORK OUT OR WORKED OUT THE DETAILS OF HOW TO KILL YOURSELF? DO YOU INTEND TO CARRY OUT THIS PLAN?: NO
1. IN THE PAST MONTH, HAVE YOU WISHED YOU WERE DEAD OR WISHED YOU COULD GO TO SLEEP AND NOT WAKE UP?: YES
5. HAVE YOU STARTED TO WORK OUT OR WORKED OUT THE DETAILS OF HOW TO KILL YOURSELF? DO YOU INTEND TO CARRY OUT THIS PLAN?: NO
2. HAVE YOU ACTUALLY HAD ANY THOUGHTS OF KILLING YOURSELF?: YES
4. HAVE YOU HAD THESE THOUGHTS AND HAD SOME INTENTION OF ACTING ON THEM?: NO
6. HAVE YOU EVER DONE ANYTHING, STARTED TO DO ANYTHING, OR PREPARED TO DO ANYTHING TO END YOUR LIFE?: NO
6. HAVE YOU EVER DONE ANYTHING, STARTED TO DO ANYTHING, OR PREPARED TO DO ANYTHING TO END YOUR LIFE?: NO

## 2025-06-11 ASSESSMENT — ACTIVITIES OF DAILY LIVING (ADL)
TOILETING: INDEPENDENT
JUDGMENT_ADEQUATE_SAFELY_COMPLETE_DAILY_ACTIVITIES: YES
BATHING: INDEPENDENT
WALKS IN HOME: INDEPENDENT
FEEDING YOURSELF: INDEPENDENT
GROOMING: INDEPENDENT
ADEQUATE_TO_COMPLETE_ADL: YES
DRESSING YOURSELF: INDEPENDENT
PATIENT'S MEMORY ADEQUATE TO SAFELY COMPLETE DAILY ACTIVITIES?: YES
HEARING - RIGHT EAR: DEAF
HEARING - LEFT EAR: DIFFICULTY WITH NOISE
LACK_OF_TRANSPORTATION: NO
LACK_OF_TRANSPORTATION: PATIENT UNABLE TO ANSWER

## 2025-06-11 ASSESSMENT — PATIENT HEALTH QUESTIONNAIRE - PHQ9
SUM OF ALL RESPONSES TO PHQ9 QUESTIONS 1 & 2: 1
1. LITTLE INTEREST OR PLEASURE IN DOING THINGS: NOT AT ALL
2. FEELING DOWN, DEPRESSED OR HOPELESS: SEVERAL DAYS

## 2025-06-11 ASSESSMENT — PAIN SCALES - GENERAL
PAINLEVEL_OUTOF10: 0 - NO PAIN
PAINLEVEL_OUTOF10: 0 - NO PAIN

## 2025-06-11 ASSESSMENT — PAIN - FUNCTIONAL ASSESSMENT: PAIN_FUNCTIONAL_ASSESSMENT: 0-10

## 2025-06-11 NOTE — SIGNIFICANT EVENT
06/11/25 1702   Discharge Planning   Living Arrangements Spouse/significant other   Support Systems Spouse/significant other;None  (mother-in-law)   Type of Residence Private residence   Do you have animals or pets at home? No   Who is requesting discharge planning? Provider   Expected Discharge Disposition Home   Does the patient need discharge transport arranged? Yes   RoundTrip coordination needed? Yes   Has discharge transport been arranged? No   Financial Resource Strain   How hard is it for you to pay for the very basics like food, housing, medical care, and heating? Not hard   Housing Stability   In the last 12 months, was there a time when you were not able to pay the mortgage or rent on time? N   At any time in the past 12 months, were you homeless or living in a shelter (including now)? N   Transportation Needs   In the past 12 months, has lack of transportation kept you from medical appointments or from getting medications? yes  (Patient indicates she has not followed up with DR. Schuster due to transport along with lack of insurance for some time.)   In the past 12 months, has lack of transportation kept you from meetings, work, or from getting things needed for daily living? No   Intensity of Service   Intensity of Service >30 min       SW met with patient today whom denies that she has been had SI. She indicates that conversation at her appointment yesterday was some type of misunderstanding. She does admit recent non-complaince concerning appointment follow up with mental health provider , Dr. Schuster and medication. She has only recently obtained active insurance and admits to transportation issues as family only has one car.   History of schizophrenia, MDD, PTSD and JACINTA.     Disposition plan is for patient return to home. Appointment to be made with Dr. Schuster and [facility]. Will provide [facility] contact number as well for additional resources. Transportation will be need for  appointments at [facility].     Goals:   Stabilize: medication per provider   Milieu and group therapy  OT consult   Obtain collateral information: Spouse  Coordination of safe and appropriate disposition plan. Appointment with mental health providers.   Home with family
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
## 2025-06-11 NOTE — CARE PLAN
The patient's goals for the shift include sleep    The clinical goals for the shift include promote rest    Over the shift, the patient did not make progress toward the following goals. Barriers to progression include medication incompliant. Recommendations to address these barriers include develop care plan.

## 2025-06-11 NOTE — SIGNIFICANT EVENT
Application for Emergency Admission      Ready for Transfer?  Is the patient medically cleared for transfer to inpatient psychiatry: Yes  Has the patient been accepted to an inpatient psychiatric hospital: Yes    Application for Emergency Admission  IN ACCORDANCE WITH [place]  The Chief Clinical Officer of: YANG Ng 6/10/2025 .8:43 PM    Reason for Hospitalization  The undersigned has reason to believe that: Karen Ordonez Is a mentally ill person subject to hospitalization by court order under division B Section 5122.01 of the Revised Code, i.e., this person:    1.Yes  Represents a substantial risk of physical harm to self as manifested by evidence of threats of, or attempts at, suicide or serious self-inflicted bodily harm    2.No Represents a substantial risk of physical harm to others as manifested by evidence of recent homicidal or other violent behavior, evidence of recent threats that place another in reasonable fear of violent behavior and serious physical harm, or other evidence of present dangerousness    3.No Represents a substantial and immediate risk of serious physical impairment or injury to self as manifested by  evidence that the person is unable to provide for and is not providing for the person's basic physical needs because of the person's mental illness and that appropriate provision for those needs cannot be made  immediately available in the community    4.Yes Would benefit from treatment in a hospital for his mental illness and is in need of such treatment as manifested by evidence of behavior that creates a grave and imminent risk to substantial rights of others or  himself.    5.No Would benefit from treatment as manifested by evidence of behavior that indicates all of the following:       (a) The person is unlikely to survive safely in the community without supervision, based on a clinical determination.       (b) The person has a history of lack of compliance with  treatment for mental illness and one of the following applies:      (i) At least twice within the thirty-six months prior to the filing of an affidavit seeking court-ordered treatment of the person under section 5122.111 of the Revised Code, the lack of compliance has been a significant factor in necessitating hospitalization in a hospital or receipt of services in a forensic or other mental health unit of a correctional facility, provided that the thirty-six-month period shall be extended by the length of any hospitalization or incarceration of the person that occurred within the thirty-six-month period.      (ii) Within the forty-eight months prior to the filing of an affidavit seeking court-ordered treatment of the person under section 5122.111 of the Revised Code, the lack of compliance resulted in one or more acts of serious violent behavior toward self or others or threats of, or attempts at, serious physical harm to self or others, provided that the forty-eight-month period shall be extended by the length of any hospitalization or incarceration of the person that occurred within the forty-eight-month period.      (c) The person, as a result of mental illness, is unlikely to voluntarily participate in necessary treatment.       (d) In view of the person's treatment history and current behavior, the person is in need of treatment in order to prevent a relapse or deterioration that would be likely to result in substantial risk of serious harm to the person or others.    (e) Represents a substantial risk of physical harm to self or others if allowed to remain at liberty pending examination.    Therefore, it is requested that said person be admitted to the above named facility.    STATEMENT OF BELIEF    Must be filled out by one of the following: a psychiatrist, licensed physician, licensed clinical psychologist, health or ,  or .  (Statement shall include the circumstances under  which the individual was taken into custody and the reason for the person's belief that hospitalization is necessary. The statement shall also include a reference to efforts made to secure the individual's property at his residence if he was taken into custody there. Every reasonable and appropriate effort should be made to take this person into custody in the least conspicuous manner possible.)    I believe that the patient benefit from inpatient psychiatric evaluation and treatment after voicing thoughts of suicide by jumping off of a bridge which she reports she has been having for a few days and she reported to her primary care physician today before being sent to the ED by police for further psychiatric assessment.     Rambo Ashley DO 6/10/2025     _____________________________________________________________   Place of Employment: [facility] [employer] [facility]    STATEMENT OF OBSERVATION BY PSYCHIATRIST, LICENSED PHYSICIAN, OR LICENSED CLINICAL PSYCHOLOGIST, IF APPLICABLE    Place of Observation (e.g., [facility], general hospital, office, emergency facility)  (If applicable, please complete)    Rambo Ashley DO 6/10/2025    _____________________________________________________________

## 2025-06-11 NOTE — CARE PLAN
The patient's goals for the shift include sleep    The clinical goals for the shift include maintain safety    Over the shift, the patient did not make progress toward the following goals. Barriers to progression include just arriving to unit overnight. Recommendations to address these barriers include acclimate pt to unit.

## 2025-06-11 NOTE — GROUP NOTE
Group Topic: Goals   Group Date: 6/11/2025  Start Time: 0730  End Time: 0800  Facilitators: William Feng   Department: [facility]     Number of Participants: 9   Group Focus: goals  Treatment Modality: Other: Goal setting  Interventions utilized were assignment  Purpose: self-worth    Name: Karen Ordonez YOB: 1980   MR: [id number]      Facilitator: Mental Health [facility]  Level of Participation: active  Quality of Participation: appropriate/pleasant  Interactions with others: appropriate  Mood/Affect: appropriate  Triggers (if applicable): None  Cognition: coherent/clear  Progress: Minimal  Comments: Participated in group  Plan: continue with services

## 2025-06-11 NOTE — NURSING NOTE
Nurse Admission Note    Reason for admission in patient's own words:  Depression worsening, not taking meds, made suicidal statements at the Dr's office    Patient living arrangements prior to admission:  With wife/partner    Legal status:  involuntarily    Medical consult notification to:  Dr. Peres    Admission folder given to:   Pt    Problems/treatment plans:  depression    Patient requests family to be notified of admission?    yes  Person notified:  Pt called her wife/partner    Strengths:  has interpersonal relationships and support and willing to follow established treatment plan    Liabilities:  noncompliant with medication, self-care deficit    Previous mental health treatment:  Past psychiatric history os MDD, PTSD, schitzophrenia    Preliminary discharge planning needs:  TBA

## 2025-06-11 NOTE — CARE PLAN
Problem: Discharge Planning  Goal: Discharge to home or other facility with appropriate resources  Outcome: Progressing     Problem: Discharge Planning - Care Management  Goal: Discharge to post-acute care or home with appropriate resources  Outcome: Progressing

## 2025-06-11 NOTE — GROUP NOTE
Group Topic: Self-Care/Wellness   Group Date: 6/11/2025  Start Time: 1030  End Time: 1130  Facilitators: CHERELLE Otero   Department: [facility] VIRTUAL    Number of Participants: 8   Group Focus: other Healthy Living Group  Treatment Modality: Other: Recreation Therapy  Interventions utilized were exploration, group exercise, patient education, problem solving, and support  Purpose: coping skills, maladaptive thinking, feelings, irrational fears, communication skills, insight or knowledge, self-worth, self-care, relapse prevention strategies, and trigger / craving management    Name: Karen Ordonez YOB: 1980   MR: [id number]      Facilitator: Recreational Therapist  Level of Participation: moderate  Quality of Participation: cooperative and distractible  Interactions with others: gave feedback  Mood/Affect: appropriate and blunted  Triggers (if applicable): n/a  Cognition: coherent/clear  Progress: Moderate  Comments: pt problem is depressed mood.  Pt joins group with little encouragement.  Engages actively at the start of session but does exit the group about [facility] through.    Plan: continue with services
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
## 2025-06-11 NOTE — CONSULTS
[facility]: [facility]  CONSULT NOTE      Karen Ordonez is a 45 y.o. female that is being seen  today for medical management at [facility].  Subjective   Patient is a 45-year-old female with history of depression, anxiety, schizophrenia who is being seen for medical management at [facility].  Patient was admitted to the ER after her psychiatrist sent her to ER for evaluation.  Patient was having increased depression and suicidal ideations.  Patient's evaluation done in the ER reviewed.  Patient's urine tox is positive for cocaine.  All other lab work reviewed and was stable.  Patient is on vitamin D supplementation.  Patient also has history of hyperlipidemia and is on statins.  Patient's blood pressure is elevated today.  No previous history of elevated blood pressure.  Will continue to monitor.  Denies any other complaints.      ROS  Negative for fever or chills  Negative for sore throat, ear pain, nasal discharge.  Positive for difficulty in hearing from the left ear.  Patient complains of discharge from the right ear.  Negative for cough, shortness of breath or wheezing  Negative for chest pain, palpitations, swelling of legs  Negative for abdominal pain, constipation, diarrhea, blood in the stools  Negative for urinary complaints  Negative for headache, dizziness, weakness or numbness  Negative for joint pain  Positive for depression and anxiety  All other systems reviewed and were negative   Vitals:    06/11/25 0511   BP: (!) 138/109   Pulse: 79   Resp: 20   Temp: 36.6 °C (97.9 °F)   SpO2: 97%      Vitals:    06/10/25 2300   Weight: 103 kg (226 lb 10.1 oz)     Body mass index is 37.71 kg/m².  Physical Exam  Constitutional: Patient does not appear to be in any acute distress  Head and Face: NCAT  Eyes: Normal external exam, EOMI  ENT: Patient has perforated drum in the left ear canal.    Cardiovascular: RRR, S1/S2, no murmurs, rubs, or gallops, radial pulses +2, no edema of  "extremities  Pulmonary: CTAB, no respiratory distress.  Abdomen: +BS, soft, non-tender, nondistended, no guarding or rebound, no masses noted  MSK: No joint swelling, normal movements of all extremities. Range of motion- normal.  Skin- No lesions, contusions, or erythema.  Peripheral puslses palpable bilaterally 2+  Neuro: AAO X3, Cranial nerves 2-12 grossly intact,DTR 2+ in all 4 limbs     LABS   [unfilled]  Lab Results   Component Value Date    GLUCOSE 104 (H) 06/10/2025    CALCIUM 9.3 06/10/2025     06/10/2025    K 4.6 06/10/2025    CO2 25 06/10/2025     06/10/2025    BUN 14 06/10/2025    CREATININE 0.68 06/10/2025     Lab Results   Component Value Date    ALT 16 06/10/2025    AST 13 06/10/2025    ALKPHOS 67 06/10/2025    BILITOT 0.4 06/10/2025     Lab Results   Component Value Date    WBC 13.1 (H) 06/10/2025    HGB 11.2 (L) 06/10/2025    HCT 37.7 06/10/2025    MCV 65 (L) 06/10/2025     06/10/2025     Lab Results   Component Value Date    CHOL 194 05/13/2025    CHOL 172 09/16/2022     Lab Results   Component Value Date    HDL 40 (L) 05/13/2025    HDL 29.0 (A) 09/16/2022     Lab Results   Component Value Date    LDLCALC 125 (H) 05/13/2025     Lab Results   Component Value Date    TRIG 174 (H) 05/13/2025    TRIG 249 (H) 09/16/2022     No results found for: \"HGBA1C\"  Other labs not included in the list above were reviewed either before or during this encounter.    History    Medical History[1]  Surgical History[2]  Family History[3]  Allergies[4]  Medications Ordered Prior to Encounter[5]  Immunization History   Administered Date(s) Administered    COVID-19, mRNA, LNP-S, PF, 30 mcg/0.3 mL dose 05/11/2021, 06/01/2021    Pfizer COVID-19 vaccine, bivalent, age 12 years and older (30 mcg/0.3 mL) 11/14/2022     Patient's medical history was reviewed and updated either before or during this encounter.  ASSESSMENT / PLAN:  Active Hospital Problems    Elevated blood pressure reading      Mixed " hyperlipidemia      Vitamin D deficiency      Chronic tubotympanic suppurative otitis media of right ear      *Mood disorder    Patient is being seen for medical management at [facility].  Patient was admitted to the ER after she was sent to ER by her psychiatrist for evaluation.  Patient has depression and schizophrenia and was having increased suicidal ideations.  Patient's lab work reviewed and has been stable urine tox positive for cocaine.  Patient's blood pressure is mildly elevated.  No history of previous hypertension.  Will continue to monitor.  Patient is on cholesterol medications.  Patient is also on vitamin D supplementation.  Will continue to monitor.  Patient also has perforation of the left eardrum and mild discharge.  Patient is being started on amoxicillin for 5 days.      Danial Peres MD          [1]   Past Medical History:  Diagnosis Date    PTSD (post-traumatic stress disorder)     Schizophrenia     Severe depression (Multi)    [2]   Past Surgical History:  Procedure Laterality Date    OTHER SURGICAL HISTORY  07/18/2022    Knee surgery    OTHER SURGICAL HISTORY  09/27/2022    Tonsillectomy with adenoidectomy    OTHER SURGICAL HISTORY  09/27/2022    Ear pressure equalization tube insertion   [3] No family history on file.  [4]   Allergies  Allergen Reactions    Tuna Oil Anaphylaxis    Bleach (Sodium Hypochlorite) Itching     Skin peels    Codeine Hcl Itching    Latex Rash    Morphine Itching   [5]   Current Facility-Administered Medications on File Prior to Encounter   Medication Dose Route Frequency Provider Last Rate Last Admin    [DISCONTINUED] nicotine (Nicoderm CQ) 14 mg/24 hr patch 1 patch  1 patch transdermal Daily Niecy Stinson PA-C        [DISCONTINUED] nicotine (Nicoderm CQ) 21 mg/24 hr patch 1 patch  1 patch transdermal Daily Niecy Stinson PA-C   1 patch at 06/10/25 0538     Current Outpatient Medications on File Prior to Encounter   Medication Sig Dispense Refill    nicotine  (Nicoderm CQ) 21 mg/24 hr patch Place 1 patch on the skin once every 24 hours.      albuterol (Ventolin HFA) 90 mcg/actuation inhaler Inhale 2 puffs every 4 hours if needed for wheezing or shortness of breath. 8 g 1    cholecalciferol (Vitamin D-3) 50 mcg (2,000 units) tablet Take 1 tablet (50 mcg) by mouth once daily.      magnesium 250 mg tablet Take 1 tablet (250 mg) by mouth once daily.      multivitamin tablet Take 1 tablet by mouth once daily.      nicotine (Nicoderm CQ) 14 mg/24 hr patch After finishing the 21mg start the 14mg x 14 days. (Patient not taking: Reported on 6/10/2025) 14 patch 0    rosuvastatin (Crestor) 10 mg tablet Take 1 tablet (10 mg) by mouth once daily. (Patient taking differently: Take 1 tablet (10 mg) by mouth once daily at bedtime.) 100 tablet 0    traZODone (Desyrel) 50 mg tablet Take 1 tablet (50 mg) by mouth once daily at bedtime.      [DISCONTINUED] nicotine (Nicoderm CQ) 21 mg/24 hr patch Use daily x  14 days. (Patient not taking: Reported on 6/10/2025) 14 patch 0    [DISCONTINUED] nicotine (Nicoderm CQ) 7 mg/24 hr patch After finishing the 14mg start 7mg patches. (Patient not taking: Reported on 6/10/2025) 14 patch 0    [DISCONTINUED] pantoprazole (ProtoNix) 40 mg EC tablet Take 1 tablet (40 mg) by mouth once daily. Do not crush, chew, or split. (Patient not taking: Reported on 6/10/2025) 30 tablet 1    [DISCONTINUED] traZODone (Desyrel) 50 mg tablet Take 1 tablet (50 mg) by mouth as needed at bedtime for sleep. (Patient not taking: Reported on 6/10/2025) 30 tablet 0

## 2025-06-11 NOTE — GROUP NOTE
Group Topic: Reminiscence   Group Date: 6/11/2025  Start Time: 1340  End Time: 1430  Facilitators: CHERELLE Otero   Department: [facility] REHABTH VIRTUAL    Number of Participants: 5   Group Focus: other [employer] Game  Treatment Modality: Other: Recreation Therapy  Interventions utilized were exploration, mental fitness, reminiscence, and story telling  Purpose: feelings and other: fun, elevate mood, increase socialization, enhance self esteem, stimulate memory    Name: Karen Ordonez YOB: 1980   MR: [id number]      Facilitator: Recreational Therapist  Level of Participation: active  Quality of Participation: appropriate/pleasant, attentive, and cooperative  Interactions with others: appropriate and gave feedback  Mood/Affect: appropriate and brightens with interaction  Triggers (if applicable): n/a  Cognition: coherent/clear  Progress: Significant  Comments: pt problem is depressed mood.  Pt displays cooperative and appropriate participation during group.  Is able to focus and engage easily.   Plan: continue with services

## 2025-06-11 NOTE — PROGRESS NOTES
Pt accepted to [facility] by candace Nye in EPIC from Dr. Ashley and will be sent with pt. RN to call report and arrange transfer before 11pm.

## 2025-06-12 LAB — BACTERIA UR CULT: NORMAL

## 2025-06-12 PROCEDURE — RXMED WILLOW AMBULATORY MEDICATION CHARGE

## 2025-06-12 PROCEDURE — S4991 NICOTINE PATCH NONLEGEND: HCPCS | Performed by: PSYCHIATRY & NEUROLOGY

## 2025-06-12 PROCEDURE — 1140000001 HC PRIVATE PSYCH ROOM DAILY

## 2025-06-12 PROCEDURE — 2500000002 HC RX 250 W HCPCS SELF ADMINISTERED DRUGS (ALT 637 FOR MEDICARE OP, ALT 636 FOR OP/ED): Performed by: PSYCHIATRY & NEUROLOGY

## 2025-06-12 PROCEDURE — 99233 SBSQ HOSP IP/OBS HIGH 50: CPT | Performed by: INTERNAL MEDICINE

## 2025-06-12 PROCEDURE — 99232 SBSQ HOSP IP/OBS MODERATE 35: CPT | Performed by: PSYCHIATRY & NEUROLOGY

## 2025-06-12 PROCEDURE — 2500000002 HC RX 250 W HCPCS SELF ADMINISTERED DRUGS (ALT 637 FOR MEDICARE OP, ALT 636 FOR OP/ED): Performed by: INTERNAL MEDICINE

## 2025-06-12 PROCEDURE — 2500000001 HC RX 250 WO HCPCS SELF ADMINISTERED DRUGS (ALT 637 FOR MEDICARE OP): Performed by: INTERNAL MEDICINE

## 2025-06-12 PROCEDURE — 2500000004 HC RX 250 GENERAL PHARMACY W/ HCPCS (ALT 636 FOR OP/ED): Performed by: INTERNAL MEDICINE

## 2025-06-12 PROCEDURE — 2500000001 HC RX 250 WO HCPCS SELF ADMINISTERED DRUGS (ALT 637 FOR MEDICARE OP): Performed by: PSYCHIATRY & NEUROLOGY

## 2025-06-12 RX ORDER — ESCITALOPRAM OXALATE 10 MG/1
10 TABLET ORAL DAILY
Qty: 30 TABLET | Refills: 0 | Status: SHIPPED | OUTPATIENT
Start: 2025-06-12

## 2025-06-12 RX ORDER — QUETIAPINE FUMARATE 50 MG/1
50 TABLET, FILM COATED ORAL NIGHTLY
Qty: 30 TABLET | Refills: 0 | Status: SHIPPED | OUTPATIENT
Start: 2025-06-12

## 2025-06-12 RX ORDER — IBUPROFEN 200 MG
1 TABLET ORAL DAILY
Qty: 28 PATCH | Refills: 0 | Status: SHIPPED | OUTPATIENT
Start: 2025-06-13 | End: 2025-07-13

## 2025-06-12 RX ORDER — LANOLIN ALCOHOL/MO/W.PET/CERES
400 CREAM (GRAM) TOPICAL DAILY
Qty: 30 TABLET | Refills: 0 | Status: SHIPPED | OUTPATIENT
Start: 2025-06-12

## 2025-06-12 RX ORDER — QUETIAPINE FUMARATE 50 MG/1
50 TABLET, FILM COATED ORAL NIGHTLY
Status: DISCONTINUED | OUTPATIENT
Start: 2025-06-12 | End: 2025-06-13 | Stop reason: HOSPADM

## 2025-06-12 RX ORDER — AMOXICILLIN 250 MG/1
750 CAPSULE ORAL EVERY 12 HOURS SCHEDULED
Qty: 24 CAPSULE | Refills: 0 | Status: SHIPPED | OUTPATIENT
Start: 2025-06-12 | End: 2025-06-17

## 2025-06-12 RX ADMIN — Medication 50 MCG: at 08:25

## 2025-06-12 RX ADMIN — ROSUVASTATIN 10 MG: 10 TABLET, FILM COATED ORAL at 20:54

## 2025-06-12 RX ADMIN — ESCITALOPRAM OXALATE 10 MG: 10 TABLET ORAL at 08:25

## 2025-06-12 RX ADMIN — TRAZODONE HYDROCHLORIDE 50 MG: 50 TABLET ORAL at 20:54

## 2025-06-12 RX ADMIN — AMOXICILLIN 750 MG: 500 CAPSULE ORAL at 08:25

## 2025-06-12 RX ADMIN — QUETIAPINE FUMARATE 50 MG: 50 TABLET ORAL at 20:54

## 2025-06-12 RX ADMIN — AMOXICILLIN 750 MG: 500 CAPSULE ORAL at 20:54

## 2025-06-12 RX ADMIN — Medication 1 TABLET: at 08:25

## 2025-06-12 RX ADMIN — NICOTINE 1 PATCH: 21 PATCH, EXTENDED RELEASE TRANSDERMAL at 08:26

## 2025-06-12 ASSESSMENT — COLUMBIA-SUICIDE SEVERITY RATING SCALE - C-SSRS
5. HAVE YOU STARTED TO WORK OUT OR WORKED OUT THE DETAILS OF HOW TO KILL YOURSELF? DO YOU INTEND TO CARRY OUT THIS PLAN?: NO
6. HAVE YOU EVER DONE ANYTHING, STARTED TO DO ANYTHING, OR PREPARED TO DO ANYTHING TO END YOUR LIFE?: NO
5. HAVE YOU STARTED TO WORK OUT OR WORKED OUT THE DETAILS OF HOW TO KILL YOURSELF? DO YOU INTEND TO CARRY OUT THIS PLAN?: NO
1. IN THE PAST MONTH, HAVE YOU WISHED YOU WERE DEAD OR WISHED YOU COULD GO TO SLEEP AND NOT WAKE UP?: YES
2. HAVE YOU ACTUALLY HAD ANY THOUGHTS OF KILLING YOURSELF?: YES
6. HAVE YOU EVER DONE ANYTHING, STARTED TO DO ANYTHING, OR PREPARED TO DO ANYTHING TO END YOUR LIFE?: NO
1. IN THE PAST MONTH, HAVE YOU WISHED YOU WERE DEAD OR WISHED YOU COULD GO TO SLEEP AND NOT WAKE UP?: YES
2. HAVE YOU ACTUALLY HAD ANY THOUGHTS OF KILLING YOURSELF?: YES
4. HAVE YOU HAD THESE THOUGHTS AND HAD SOME INTENTION OF ACTING ON THEM?: NO
4. HAVE YOU HAD THESE THOUGHTS AND HAD SOME INTENTION OF ACTING ON THEM?: NO

## 2025-06-12 ASSESSMENT — PAIN - FUNCTIONAL ASSESSMENT
PAIN_FUNCTIONAL_ASSESSMENT: 0-10
PAIN_FUNCTIONAL_ASSESSMENT: 0-10

## 2025-06-12 ASSESSMENT — PAIN SCALES - GENERAL
PAINLEVEL_OUTOF10: 0 - NO PAIN
PAINLEVEL_OUTOF10: 0 - NO PAIN

## 2025-06-12 NOTE — PROGRESS NOTES
"     МАРИНА placed call to DR. Schuster office , ADOD is tomorrow.   Dr. Schuster : [phone]   [address] [id number]     Appointment set for June 18th at 1pm with Dr. Schuster.   Awaiting [facility] appointment for medication management.   Continued follow up in the for mental health and JACINTA as relates to cocaine and THC usage.     2:52pm: МАРИНА spoke with HELIO, spouse Carrie Ordonez whom states she if very supportive of patient follow up in the community for mental health needs. She has visited patient on site and spoke with her via phone. Patient mood has improved, patient expresses desire to follow treatment plan here and in community to wife. One concern mentioned by wife and patient as that patient appears more sleepy otherwise no other concerns voiced. No weapons in the home. Carrie states that prior to admission patient did not voice any SI, HI , A/V H, she was noncompliant with mental health care re: medications and appointments. Carrie will encourage compliance and believes Dr. Schuster along with [facility] services are a \" good fit \" for this patient. Patient and Josefina shared the need for decreased social media/screen time.   Mental health care and JACINTA issues address with follow up in community to continue for patient care needs.     Appointment at [facility] , [place] , made for June 17 @ 11:30am . МАРИНА awaiting provider name and transport information.     Disposition planning in process.   "

## 2025-06-12 NOTE — GROUP NOTE
Group Topic: Goals   Group Date: 6/12/2025  Start Time: 0730  End Time: 0830  Facilitators: Narda Bender   Department: [facility] Geriatric     Number of Participants: 10   Group Focus: goals  Treatment Modality: Other: Goal setting   Interventions utilized were assignment  Purpose: other: Goal setting    Name: Karen Ordonez YOB: 1980   MR: [id number]      Facilitator: [facility]  Level of Participation: did not attend  Progress: None  Comments: Patient presents with PTSD and depression. Patient did not attend goals group.  Plan: continue with services and patient will be encouraged to participate in daily goals group.

## 2025-06-12 NOTE — CARE PLAN
The patient's goals for the shift include     The clinical goals for the shift include Maintain patient safety/ attend groups/ medication compliance    Problem: Pain - Adult  Goal: Verbalizes/displays adequate comfort level or baseline comfort level  Outcome: Progressing     Problem: Safety - Adult  Goal: Free from fall injury  Outcome: Progressing     Problem: Chronic Conditions and Co-morbidities  Goal: Patient's chronic conditions and co-morbidity symptoms are monitored and maintained or improved  Outcome: Progressing     Problem: Nutrition  Goal: Nutrient intake appropriate for maintaining nutritional needs  Outcome: Progressing     Problem: Potential for Harm to Self or Others  Goal: Denies harm toward self or others  Outcome: Progressing  Goal: Free from restraint events  Outcome: Progressing     Problem: Alteration in Sleep  Goal: STG - Reports nightly sleep, duration, and quality  Outcome: Progressing  Goal: STG - Informs staff if unable to sleep  Outcome: Progressing

## 2025-06-12 NOTE — CARE PLAN
The clinical goals for the shift include maintain safety, promote rest.    Over the shift, the patient did make progress toward the following goals.    Problem: Potential for Harm to Self or Others  Goal: Participates in unit activities  Outcome: Progressing

## 2025-06-12 NOTE — GROUP NOTE
Group Topic: Goals   Group Date: 6/11/2025  Start Time: 2000  End Time: 2015  Facilitators: Ai Castro   Department: [facility] Geriatric     Number of Participants: 10   Group Focus: check in  Treatment Modality: Skills Training and Other: reflection  Interventions utilized were reminiscence and story telling  Purpose: feelings    Name: Karen Ordonez YOB: 1980   MR: [id number]      Facilitator: Mental [facility]  Level of Participation: minimal  Quality of Participation: appropriate/pleasant  Interactions with others: appropriate  Mood/Affect: irritable  Triggers (if applicable): none  Cognition: coherent/clear  Progress: Minimal  Comments: patient problem schizophrenia, PTSD  Plan: continue with services

## 2025-06-12 NOTE — GROUP NOTE
Group Topic: Other   Group Date: 6/12/2025  Start Time: 1330  End Time: 1420  Facilitators: CHERELLE Otero   Department: [facility] REHABTH VIRTUAL    Number of Participants: 9   Group Focus: other [employer] Game  Treatment Modality: Other: Recreation Therapy  Interventions utilized were mental fitness  Purpose: other: fun, elevate mood, increase socialization, enhance self esteem, stimulate memory    Name: Karen Ordonez YOB: 1980   MR: [id number]      Facilitator: Recreational Therapist  Level of Participation: moderate  Quality of Participation: appropriate/pleasant, attentive, cooperative, and distractible  Interactions with others: appropriate and gave feedback  Mood/Affect: appropriate  Triggers (if applicable): n/a  Cognition: coherent/clear  Progress: Moderate  Comments: pt problem is depressed mood.  Pt joins group with little encouragement.  Is able to focus for about 20 minutes.  After that, appears distractible and is in and out of group a few times before returning to her room.    Plan: continue with services

## 2025-06-12 NOTE — PROGRESS NOTES
Karen Ordonez is a 45 y.o. female on day 2 of admission presenting with Mood disorder.      Subjective   Chart reviewed and case discussed with multidisciplinary treatment team.    Patient found in her room this afternoon resting before dinner.  Reports feeling that her mood is good sleeping well eating well and not feeling depressed and anxious suicidal or homicidal.  Denying psychotic features.  No evidence of jaqueline.  Patient reports she is bored on the unit and feels that she is able to safely transition home tomorrow.   has coordinated outpatient follow-up with psychiatry therapy and substance use disorder treatment.  Patient reports tolerating the medications well liking the new doses not having side effects.  She agrees to continue taking.  We discussed with patient abstinence from cocaine marijuana and illicit substances.  Patient challenged marijuana asking if she could use CBD instead of THC.  Encourage patient not to use delta 9 products but that delta 8 did not have the psychoactive component if she finds relief from CBD use though this provider does not manage this, it is less contraindicated than using delta 9 products.  Patient reports she will attempt abstinence.  Collateral has been received supportive of discharge tomorrow.  Patient's wife can come pick patient up tomorrow afternoon after 6 PM from the unit for discharge.  We will have another 24 hours from this time to continue monitoring as such.  Medications will be procured from retail pharmacy for home-going purposes as such.  Patient reports benefit from using nicotine patch in hospital as well and will continue to use this at discharge now identifying her self is an nontobacco user moving forward.  Likely limited benefit from continued stay beyond tomorrow and the treatment plan can be completed as designed.       Objective     Last Recorded Vitals  Blood pressure (!) 129/91, pulse 83, temperature 36.6 °C (97.9 °F),  "temperature source Temporal, resp. rate 17, height 1.651 m (5' 5\"), weight 103 kg (226 lb 10.1 oz), SpO2 96%.    Sleep Log  Estimated \"Sleeping\" Durations   Night Est. Hours   06/10 6.25-7.00   06/11 11.50-12.75   06/12 0.00        Review of Systems    Psychiatric ROS - Adult  Anxiety: General Anxiety Disorder (KIET)KIET Behaviors: excessive anxiety/worry  Depression: anhedonia, concentration, and interest  Delirium: negative  Psychosis: negative  Rajani: negative  Safety Issues: passive death wish  Psychiatric ROS Comment: as noted       Physical Exam      Mental Status Exam  General Appearance: Age appearing heavyset female wearing hospital provided garments no acute distress ambulating steadily without assistance, no abnormal involuntary movements appreciated  Speech: Increased volume likely related to hard of hearing status, otherwise conversational rate and production  Mood: \"I feel so much better\"  Affect: Expansive and Congruent with mood and topic of conversation  Thought Process: Simple, logical  Thought Associations: No loosening of associations  Thought Content: Denies thoughts of harming self or others, recognizing presence of depression and previous nonadherence with mixed substance use, agreeable with treatment and return to the community  Perception: No perceptual abnormalities noted  Level of Consciousness: Alert  Orientation: Alert and oriented to person, place, time and situation when asked  Attention and Concentration: Intact  Recent Memory: Intact as evidenced by ability to recall details from the past 24 hours   Remote Memory: Intact as evidenced by ability to recall previous medical issues   Executive function: Intact  Language: Naming intact  Fund of knowledge: Good  Insight: Probably fair and similar to baseline-demonstrates awareness of chronic mental health symptoms and substance use though minimizes contribution of accepting regular treatment for psychiatric care and avoiding substance as " optimal  Judgment: Fair, as evidenced by ability to reason through medical decision making and compliance with treatment recommendations     Psychiatric Risk Assessment  Violence Risk Assessment: lower socioeconomic class, major mental illness, substance abuse, and unemployment  Acute Risk of Harm to Others is Considered: low   Suicide Risk Assessment: , current psychiatric illness, and substance abuse  Protective Factors against Suicide: hopefulness/future orientation, marriage/partnership, positive family relationships, sense of responsibility toward family, and social support/connectedness  Acute Risk of Harm to Self is Considered: moderate    Relevant Results               Results for orders placed or performed during the hospital encounter of 06/10/25 (from the past 96 hours)   Hemoglobin A1C   Result Value Ref Range    Hemoglobin A1C 5.5 See comment %    Estimated Average Glucose 111 Not Established mg/dL       Assessment & Plan  Mood disorder    Elevated blood pressure reading    Mixed hyperlipidemia    Vitamin D deficiency    Chronic 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  Likely presenting more so with chronic depressive features with anxiety mixed with substance use of cannabis and cocaine, rule out cluster b traits, intellectual delay.  Presented to the outpatient primary care office for a b12 injection and described worsening depression and suicidality in the setting of nonadherence with treatment, substance use, increase stressors in the home.  Then admitted from outpatient primary care office to the emergency department at [facility] to this unit.  On the unit, calm and cooperative participatory with assessments asking appropriate pertinent questions participating to make decisions related to her care.  Willing to accept treatment in the  form of medications and being relinked with psychiatric providers in the community.  We will work to obtain collateral to this extent and continue admission at this time for safety stabilization and monitoring with the care plan below.     SBIRT - Patient was screened for substances of abuse and had a positive screen for cannabis and cocaine.  Brief intervention then held with patient discussing the impact of substance abuse/use disorder on bio/psycho/social functioning and patient's readiness for change.  On discharge, patient will be referred to treatment for substance use disorder.  There are no FDA approved medications to treat cannabis and cocaine use disorders thus none will be offered.     Biological -     Planning for discharge tomorrow - homegoing meds ordered of [facility] pharmacy     Continue Lexapro 10 mg daily for treatment of anxiety and depression  Titrate Seroquel to 50 mg nightly for mood disorder  - further titration in community by outpatient team     Medical to please co-manage pertinences     Removal of PT assessment given ambulation ease      Discussion with patient regarding risk benefits and alternatives and side effects with opportunity to ask questions and questions answered.  Patient given consent to the plan as noted     2. Psychological -        Patient is encouraged to participate with the therapeutic milieu and program and group therapy        3. Sociological -       Patient encouraged to cooperate with social work staff on issues relevant to discharge planning                I personally spent 25 minutes today providing care for this patient, including preparation, face to face time, documentation and other services such as review of medical records, diagnostic result, patient education, counseling, coordination of care as specified in the encounter.    Parts of this chart have been completed using voice recognition software.  Please excuse any errors of transcription.  Despite  the medical decision making time stamp, my medical decision making has taken place during the patient's entire visit.  Thought process and reason for plan has been formulated from the time that I saw the patient until the time of disposition and is not specific to one specific moment during their visit and furthermore the medical decision making encompasses the entire chart and not only that represented in this note.    Nutrition/Malnutrition:  The diagnosis of malnutrition has significant impact on risk adjustment, mortality and readmission rates, length of stay and hospital reimbursement.  The below is a representation of nutrition status if evaluated.  If blank, there is no associated malnutrition finding to incorporate per the dietician team:              Bentley Paulino, DO

## 2025-06-12 NOTE — NURSING NOTE
BIRP NOTE     Problem:  Depression     Behavior:  Pt sitting in group room watching tv at start of shift. Pleasant upon approach, social with peers. Compliant with meds, cooperative with care.  Group Participation: n/a  Appetite/Meals: HS snack provided     Interventions:  1:1 provided. 2100-Evening meds administered per orders along with trazodone for sleep per pt request.    Response:  Pt resting in bed at this time. No s/s of distress noted.     Plan:  Will continue to monitor behaviors and effectiveness of interventions while maintaining pt safety.

## 2025-06-12 NOTE — GROUP NOTE
Group Topic: Self-Care/Wellness   Group Date: 6/12/2025  Start Time: 1030  End Time: 1140  Facilitators: CHERELLE Otero   Department: [facility] REH[facility] VIRTUAL    Number of Participants: 10   Group Focus: other Positive Steps for Well Being  Treatment Modality: Other: Recreation Therapy  Interventions utilized were exploration, group exercise, patient education, problem solving, and support  Purpose: coping skills, maladaptive thinking, feelings, irrational fears, communication skills, insight or knowledge, self-worth, self-care, relapse prevention strategies, and trigger / craving management    Name: Karen Ordonez YOB: 1980   MR: [id number]      Facilitator: Recreational Therapist  Level of Participation: did not attend  Quality of Participation: did not attend  Interactions with others: did not attend  Mood/Affect: did not attend  Triggers (if applicable): n/a  Cognition: did not attend  Progress: None  Comments: pt problem is depressed mood.  Pt is soundly sleeping in her room.  Does not wake up to attend group.  Snoring loudly.  Handout left at bedside.   Plan: continue with services

## 2025-06-12 NOTE — PROGRESS NOTES
[facility]: [facility]  PROGRESS NOTE      Karen Ordonez is a 45 y.o. female that is being seen  today for follow-up of [facility]..  Subjective   Patient is being seen for follow-up at [facility].  Patient is on antibiotics for ear infection.  Patient has been doing well.  Participating in activities.  Denies any new medical complaints.  Patient's blood pressure has been fairly controlled.      ROS  Negative for fever or chills  Negative for sore throat, ear pain, nasal discharge  Negative for cough, shortness of breath or wheezing  Negative for chest pain, palpitations, swelling of legs  Negative for abdominal pain, constipation, diarrhea, blood in the stools  Negative for urinary complaints  Negative for headache, dizziness, weakness or numbness  Negative for joint pain  Positive for depression and anxiety  All other systems reviewed and were negative   Vitals:    06/12/25 0500   BP: 109/71   Pulse: 73   Resp: 18   Temp: 36.7 °C (98.1 °F)   SpO2: 94%      Vitals:    06/10/25 2300   Weight: 103 kg (226 lb 10.1 oz)     Body mass index is 37.71 kg/m².  Physical Exam  Constitutional: Patient does not appear to be in any acute distress  Head and Face: NCAT  Eyes: Normal external exam, EOMI  ENT: Normal external inspection of ears and nose. Oropharynx normal.  Cardiovascular: RRR, S1/S2, no murmurs, rubs, or gallops, radial pulses +2, no edema of extremities  Pulmonary: CTAB, no respiratory distress.  Abdomen: +BS, soft, non-tender, nondistended, no guarding or rebound, no masses noted  MSK: No joint swelling, normal movements of all extremities. Range of motion- normal.  Skin- No lesions, contusions, or erythema.  Peripheral puslses palpable bilaterally 2+  Neuro: AAO X3, Cranial nerves 2-12 grossly intact,DTR 2+ in all 4 limbs   Psychiatric: Judgment intact. Appropriate mood and behavior    LABS   [unfilled]  Lab Results   Component Value Date    GLUCOSE 104 (H) 06/10/2025    CALCIUM 9.3  06/10/2025     06/10/2025    K 4.6 06/10/2025    CO2 25 06/10/2025     06/10/2025    BUN 14 06/10/2025    CREATININE 0.68 06/10/2025     Lab Results   Component Value Date    ALT 16 06/10/2025    AST 13 06/10/2025    ALKPHOS 67 06/10/2025    BILITOT 0.4 06/10/2025     Lab Results   Component Value Date    WBC 13.1 (H) 06/10/2025    HGB 11.2 (L) 06/10/2025    HCT 37.7 06/10/2025    MCV 65 (L) 06/10/2025     06/10/2025     Lab Results   Component Value Date    CHOL 194 05/13/2025    CHOL 172 09/16/2022     Lab Results   Component Value Date    HDL 40 (L) 05/13/2025    HDL 29.0 (A) 09/16/2022     Lab Results   Component Value Date    LDLCALC 125 (H) 05/13/2025     Lab Results   Component Value Date    TRIG 174 (H) 05/13/2025    TRIG 249 (H) 09/16/2022     Lab Results   Component Value Date    HGBA1C 5.5 06/11/2025     Other labs not included in the list above were reviewed either before or during this encounter.    History    Medical History[1]  Surgical History[2]  Family History[3]  Allergies[4]  Medications Ordered Prior to Encounter[5]  Immunization History   Administered Date(s) Administered    COVID-19, mRNA, LNP-S, PF, 30 mcg/0.3 mL dose 05/11/2021, 06/01/2021    Pfizer COVID-19 vaccine, bivalent, age 12 years and older (30 mcg/0.3 mL) 11/14/2022     Patient's medical history was reviewed and updated either before or during this encounter.  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  Patient's blood pressure has been fairly controlled.  Patient is on antibiotics for ear infection.  Patient is being seen by geropsych team for depression and anxiety.  Denies any new medical complaints.      Danial Peres MD        [1]   Past Medical History:  Diagnosis Date    PTSD  (post-traumatic stress disorder)     Schizophrenia     Severe depression (Multi)    [2]   Past Surgical History:  Procedure Laterality Date    OTHER SURGICAL HISTORY  07/18/2022    Knee surgery    OTHER SURGICAL HISTORY  09/27/2022    Tonsillectomy with adenoidectomy    OTHER SURGICAL HISTORY  09/27/2022    Ear pressure equalization tube insertion   [3] No family history on file.  [4]   Allergies  Allergen Reactions    Tuna Oil Anaphylaxis    Bleach (Sodium Hypochlorite) Itching     Skin peels    Codeine Hcl Itching    Latex Rash    Morphine Itching   [5]   No current facility-administered medications on file prior to encounter.     Current Outpatient Medications on File Prior to Encounter   Medication Sig Dispense Refill    nicotine (Nicoderm CQ) 21 mg/24 hr patch Place 1 patch on the skin once every 24 hours.      albuterol (Ventolin HFA) 90 mcg/actuation inhaler Inhale 2 puffs every 4 hours if needed for wheezing or shortness of breath. 8 g 1    cholecalciferol (Vitamin D-3) 50 mcg (2,000 units) tablet Take 1 tablet (50 mcg) by mouth once daily.      magnesium 250 mg tablet Take 1 tablet (250 mg) by mouth once daily.      multivitamin tablet Take 1 tablet by mouth once daily.      nicotine (Nicoderm CQ) 14 mg/24 hr patch After finishing the 21mg start the 14mg x 14 days. (Patient not taking: Reported on 6/10/2025) 14 patch 0    rosuvastatin (Crestor) 10 mg tablet Take 1 tablet (10 mg) by mouth once daily. (Patient taking differently: Take 1 tablet (10 mg) by mouth once daily at bedtime.) 100 tablet 0    traZODone (Desyrel) 50 mg tablet Take 1 tablet (50 mg) by mouth once daily at bedtime.      [DISCONTINUED] nicotine (Nicoderm CQ) 21 mg/24 hr patch Use daily x  14 days. (Patient not taking: Reported on 6/10/2025) 14 patch 0    [DISCONTINUED] nicotine (Nicoderm CQ) 7 mg/24 hr patch After finishing the 14mg start 7mg patches. (Patient not taking: Reported on 6/10/2025) 14 patch 0    [DISCONTINUED] pantoprazole  (ProtoNix) 40 mg EC tablet Take 1 tablet (40 mg) by mouth once daily. Do not crush, chew, or split. (Patient not taking: Reported on 6/10/2025) 30 tablet 1    [DISCONTINUED] traZODone (Desyrel) 50 mg tablet Take 1 tablet (50 mg) by mouth as needed at bedtime for sleep. (Patient not taking: Reported on 6/10/2025) 30 tablet 0

## 2025-06-13 ENCOUNTER — PHARMACY VISIT (OUTPATIENT)
Dept: PHARMACY | Facility: CLINIC | Age: 45
End: 2025-06-13
Payer: MEDICAID

## 2025-06-13 VITALS
DIASTOLIC BLOOD PRESSURE: 76 MMHG | OXYGEN SATURATION: 97 % | HEART RATE: 86 BPM | HEIGHT: 65 IN | SYSTOLIC BLOOD PRESSURE: 109 MMHG | TEMPERATURE: 98.6 F | RESPIRATION RATE: 17 BRPM | WEIGHT: 226.63 LBS | BODY MASS INDEX: 37.76 KG/M2

## 2025-06-13 PROBLEM — F12.10 CANNABIS ABUSE: Status: ACTIVE | Noted: 2025-06-13

## 2025-06-13 PROBLEM — F14.10 COCAINE ABUSE: Status: ACTIVE | Noted: 2025-06-13

## 2025-06-13 PROBLEM — F17.200 TOBACCO USE DISORDER: Status: ACTIVE | Noted: 2025-06-13

## 2025-06-13 PROCEDURE — 2500000001 HC RX 250 WO HCPCS SELF ADMINISTERED DRUGS (ALT 637 FOR MEDICARE OP): Performed by: PSYCHIATRY & NEUROLOGY

## 2025-06-13 PROCEDURE — 2500000004 HC RX 250 GENERAL PHARMACY W/ HCPCS (ALT 636 FOR OP/ED): Performed by: INTERNAL MEDICINE

## 2025-06-13 PROCEDURE — 99238 HOSP IP/OBS DSCHRG MGMT 30/<: CPT | Performed by: PSYCHIATRY & NEUROLOGY

## 2025-06-13 PROCEDURE — 2500000002 HC RX 250 W HCPCS SELF ADMINISTERED DRUGS (ALT 637 FOR MEDICARE OP, ALT 636 FOR OP/ED): Performed by: PSYCHIATRY & NEUROLOGY

## 2025-06-13 PROCEDURE — S4991 NICOTINE PATCH NONLEGEND: HCPCS | Performed by: PSYCHIATRY & NEUROLOGY

## 2025-06-13 PROCEDURE — 2500000001 HC RX 250 WO HCPCS SELF ADMINISTERED DRUGS (ALT 637 FOR MEDICARE OP): Performed by: INTERNAL MEDICINE

## 2025-06-13 RX ADMIN — NICOTINE 1 PATCH: 21 PATCH, EXTENDED RELEASE TRANSDERMAL at 08:08

## 2025-06-13 RX ADMIN — ESCITALOPRAM OXALATE 10 MG: 10 TABLET ORAL at 08:06

## 2025-06-13 RX ADMIN — Medication 1 TABLET: at 08:06

## 2025-06-13 RX ADMIN — AMOXICILLIN 750 MG: 500 CAPSULE ORAL at 08:06

## 2025-06-13 RX ADMIN — Medication 50 MCG: at 08:06

## 2025-06-13 ASSESSMENT — PAIN - FUNCTIONAL ASSESSMENT: PAIN_FUNCTIONAL_ASSESSMENT: 0-10

## 2025-06-13 ASSESSMENT — PAIN SCALES - GENERAL: PAINLEVEL_OUTOF10: 0 - NO PAIN

## 2025-06-13 NOTE — GROUP NOTE
Group Topic: Goals   Group Date: 6/12/2025  Start Time: 2000  End Time: 2015  Facilitators: Ai Castro   Department: [facility] Geriatric     Number of Participants: 9   Group Focus: check in  Treatment Modality: Interpersonal Therapy and Other: reflection  Interventions utilized were mental fitness and reminiscence  Purpose: feelings and relapse prevention strategies    Name: Karen Ordonez YOB: 1980   MR: [id number]      Facilitator: Mental Health [facility]  Level of Participation: moderate  Quality of Participation: appropriate/pleasant  Interactions with others: appropriate  Mood/Affect: appropriate  Triggers (if applicable): none  Cognition: coherent/clear  Progress: Moderate  Comments: patient problem schizophrenia, depression  Plan: continue with services

## 2025-06-13 NOTE — NURSING NOTE
"Discharge Nursing Note    Discharge date: 6/13/2025  Discharge time: 1745    Discharged to:  home    Transportation provided by:  Patient's wife Josefina is present to transport patient home.      Responsible person notified of discharge/transfer?  Include name of person if answering \"YES\"  No; patient declined.     Patient left with all belongings:  Yes    Patient left with discharge medication list:  Yes    Patient left with discharge instructions:  Yes    AVS/Continuing Care Plan discussed with patient and copy given to either patient or handed to medical transport for discharges to facilities:  Yes    Other concerns at discharge:  None    Presentation on discharge:  Discharge instructions and medications reviewed with patient and patient voices understanding. Patient discharged with all belongings. Denies SI.   "

## 2025-06-13 NOTE — GROUP NOTE
"Group Topic: Illness Education   Group Date: 6/13/2025  Start Time: 1000  End Time: 1140  Facilitators: CHERELLE Otero   Department: [facility] REHABTH VIRTUAL    Number of Participants: 8   Group Focus: other Coping skills/illness education  Treatment Modality: Other: Recreation Therapy  Interventions utilized were exploration, group exercise, patient education, problem solving, and support  Purpose: coping skills, maladaptive thinking, feelings, irrational fears, communication skills, insight or knowledge, self-worth, self-care, relapse prevention strategies, and trigger / craving management    Name: Karen Ordonez YOB: 1980   MR: [id number]      Facilitator: Recreational Therapist  Level of Participation: did not attend  Quality of Participation: did not attend  Interactions with others: did not attend  Mood/Affect: did not attend  Triggers (if applicable): n/a  Cognition: did not attend  Progress: None  Comments: pt problem is depressed mood.  Pt refuses to attend group at this time.  Reports she is \"too tired to attend\".  Handouts offered after group.  Plan: continue with services      "

## 2025-06-13 NOTE — NURSING NOTE
MIRIAM NOTE     Problem:  Depression     Behavior:  Pt sitting in group room watching tv among peers. Pleasant and social, behavior in control. Compliant with meds, cooperative with care. Pt reports she is ready for possible discharge tomorrow, talking to this RN about the close relationship she has with her in-laws.   Group Participation: n/a  Appetite/Meals: HS snack provided     Interventions:  1:1 provided. 2054-Evening meds administered per orders along with PRN trazodone for sleep per pt request.    Response:  Pt resting in bed at this time. No s/s of distress noted.     Plan:  Will continue to monitor behaviors and effectiveness of interventions while maintaining pt safety.

## 2025-06-13 NOTE — DISCHARGE INSTRUCTIONS
Patient was screened for substances of abuse and had a positive screen for cannabis and cocaine. Brief intervention then held with patient discussing the impact of substance abuse/use disorder on bio/psycho/social functioning and patient's readiness for change. On discharge, patient is referred to treatment for substance use disorders. There are no FDA approved medications to treat cannabis and cocaine use disorders thus none were offered.
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
## 2025-06-13 NOTE — CARE PLAN
The patient's goals for the shift include     The clinical goals for the shift include Maintain safety/ medication compliance/ attend groups    Problem: Pain - Adult  Goal: Verbalizes/displays adequate comfort level or baseline comfort level  Outcome: Progressing     Problem: Safety - Adult  Goal: Free from fall injury  Outcome: Progressing     Problem: Chronic Conditions and Co-morbidities  Goal: Patient's chronic conditions and co-morbidity symptoms are monitored and maintained or improved  Outcome: Progressing     Problem: Nutrition  Goal: Nutrient intake appropriate for maintaining nutritional needs  Outcome: Progressing     Problem: Potential for Harm to Self or Others  Goal: Participates in unit activities  Outcome: Progressing  Goal: Denies harm toward self or others  Outcome: Progressing  Goal: Free from restraint events  Outcome: Progressing     Problem: Alteration in Sleep  Goal: STG - Reports nightly sleep, duration, and quality  Outcome: Progressing  Goal: STG - Informs staff if unable to sleep  Outcome: Progressing

## 2025-06-13 NOTE — GROUP NOTE
Group Topic: Goals   Group Date: 6/13/2025  Start Time: 0730  End Time: 0830  Facilitators: Narda Bender   Department: [facility]     Number of Participants: 12   Group Focus: other Goals  Treatment Modality: Other: Goal setting  Interventions utilized were assignment  Purpose: other: Goal setting    Name: Karen Ordonez YOB: 1980   MR: [id number]      Facilitator: [facility]  Level of Participation: moderate  Quality of Participation: appropriate/pleasant and cooperative  Interactions with others: appropriate  Mood/Affect: appropriate  Cognition: coherent/clear  Progress: Moderate  Comments: Patient is here for depressed mood. Patient participated in setting goals.  Plan: continue with services

## 2025-06-13 NOTE — GROUP NOTE
Group Topic: Other   Group Date: 6/13/2025  Start Time: 1330  End Time: 1430  Facilitators: CHERELLE Otero   Department: [facility]    Number of Participants: 5   Group Focus: other Mind Joggers  Treatment Modality: Other: Recreation Therapy  Interventions utilized were mental fitness and reminiscence  Purpose: other: fun, elevate mood, increase socialization, enhance self esteem, stimulate memory    Name: Karen Ordonez YOB: 1980   MR: [id number]      Facilitator: Recreational Therapist  Level of Participation: did not attend  Quality of Participation: did not attend  Interactions with others: did not attend  Mood/Affect: did not attend  Triggers (if applicable): n/a  Cognition: did not attend  Progress: None  Comments: pt problem is depressed mood.  Pt is engaging with other unit staff at  this time related to discharge.  No handout to offer.  Plan: continue with services

## 2025-06-17 ENCOUNTER — CLINICAL SUPPORT (OUTPATIENT)
Dept: PRIMARY CARE | Facility: CLINIC | Age: 45
End: 2025-06-17
Payer: COMMERCIAL

## 2025-06-17 DIAGNOSIS — E53.8 VITAMIN B12 DEFICIENCY: Primary | ICD-10-CM

## 2025-06-17 PROCEDURE — 96372 THER/PROPH/DIAG INJ SC/IM: CPT

## 2025-06-17 RX ORDER — CYANOCOBALAMIN 1000 UG/ML
1000 INJECTION, SOLUTION INTRAMUSCULAR; SUBCUTANEOUS ONCE
Status: COMPLETED | OUTPATIENT
Start: 2025-06-17 | End: 2025-06-17

## 2025-06-17 RX ADMIN — CYANOCOBALAMIN 1000 MCG: 1000 INJECTION, SOLUTION INTRAMUSCULAR; SUBCUTANEOUS at 10:20

## 2025-08-12 ENCOUNTER — APPOINTMENT (OUTPATIENT)
Dept: PRIMARY CARE | Facility: CLINIC | Age: 45
End: 2025-08-12
Payer: COMMERCIAL

## 2025-08-12 VITALS
SYSTOLIC BLOOD PRESSURE: 119 MMHG | HEIGHT: 65 IN | OXYGEN SATURATION: 93 % | RESPIRATION RATE: 18 BRPM | BODY MASS INDEX: 36.99 KG/M2 | DIASTOLIC BLOOD PRESSURE: 83 MMHG | WEIGHT: 222 LBS | HEART RATE: 86 BPM

## 2025-08-12 DIAGNOSIS — J45.20 MILD INTERMITTENT ASTHMA, UNCOMPLICATED (HHS-HCC): ICD-10-CM

## 2025-08-12 DIAGNOSIS — R53.83 OTHER FATIGUE: ICD-10-CM

## 2025-08-12 DIAGNOSIS — E53.8 VITAMIN B12 DEFICIENCY: ICD-10-CM

## 2025-08-12 DIAGNOSIS — H66.90 ACUTE OTITIS MEDIA, UNSPECIFIED OTITIS MEDIA TYPE: ICD-10-CM

## 2025-08-12 DIAGNOSIS — Z12.31 VISIT FOR SCREENING MAMMOGRAM: ICD-10-CM

## 2025-08-12 DIAGNOSIS — F39 MOOD DISORDER: ICD-10-CM

## 2025-08-12 DIAGNOSIS — E78.2 MIXED HYPERLIPIDEMIA: ICD-10-CM

## 2025-08-12 DIAGNOSIS — R06.83 SNORING: ICD-10-CM

## 2025-08-12 DIAGNOSIS — Z11.4 SCREENING FOR HUMAN IMMUNODEFICIENCY VIRUS: ICD-10-CM

## 2025-08-12 DIAGNOSIS — Z00.00 HEALTH CARE MAINTENANCE: ICD-10-CM

## 2025-08-12 DIAGNOSIS — D64.9 ANEMIA, UNSPECIFIED TYPE: ICD-10-CM

## 2025-08-12 DIAGNOSIS — F17.200 TOBACCO USE DISORDER: ICD-10-CM

## 2025-08-12 DIAGNOSIS — F33.2 SEVERE EPISODE OF RECURRENT MAJOR DEPRESSIVE DISORDER, WITHOUT PSYCHOTIC FEATURES (MULTI): ICD-10-CM

## 2025-08-12 PROBLEM — F17.209 NICOTINE DEPENDENCE WITH NICOTINE-INDUCED DISORDER: Status: ACTIVE | Noted: 2025-08-12

## 2025-08-12 PROCEDURE — 3008F BODY MASS INDEX DOCD: CPT | Performed by: INTERNAL MEDICINE

## 2025-08-12 PROCEDURE — 99214 OFFICE O/P EST MOD 30 MIN: CPT | Performed by: INTERNAL MEDICINE

## 2025-08-12 PROCEDURE — 96372 THER/PROPH/DIAG INJ SC/IM: CPT | Performed by: INTERNAL MEDICINE

## 2025-08-12 RX ORDER — ARIPIPRAZOLE 10 MG/1
10 TABLET ORAL
Qty: 30 TABLET | Refills: 0 | Status: SHIPPED | OUTPATIENT
Start: 2025-08-12

## 2025-08-12 RX ORDER — IBUPROFEN 200 MG
1 TABLET ORAL DAILY
Qty: 28 PATCH | Refills: 0 | Status: SHIPPED | OUTPATIENT
Start: 2025-08-12 | End: 2025-09-11

## 2025-08-12 RX ORDER — CIPROFLOXACIN AND DEXAMETHASONE 3; 1 MG/ML; MG/ML
4 SUSPENSION/ DROPS AURICULAR (OTIC) 2 TIMES DAILY
Qty: 7.5 ML | Refills: 0 | Status: SHIPPED | OUTPATIENT
Start: 2025-08-12 | End: 2025-08-19

## 2025-08-12 RX ORDER — FLUTICASONE PROPIONATE AND SALMETEROL XINAFOATE 45; 21 UG/1; UG/1
2 AEROSOL, METERED RESPIRATORY (INHALATION)
Qty: 12 G | Refills: 3 | Status: SHIPPED | OUTPATIENT
Start: 2025-08-12

## 2025-08-12 RX ORDER — ARIPIPRAZOLE 10 MG/1
10 TABLET ORAL
COMMUNITY
Start: 2025-07-30 | End: 2025-08-12 | Stop reason: SDUPTHER

## 2025-08-12 RX ORDER — CYANOCOBALAMIN 1000 UG/ML
1000 INJECTION, SOLUTION INTRAMUSCULAR; SUBCUTANEOUS ONCE
Status: COMPLETED | OUTPATIENT
Start: 2025-08-12 | End: 2025-08-12

## 2025-08-12 RX ORDER — DOXYCYCLINE 100 MG/1
100 CAPSULE ORAL 2 TIMES DAILY
Qty: 20 CAPSULE | Refills: 0 | Status: SHIPPED | OUTPATIENT
Start: 2025-08-12 | End: 2025-08-22

## 2025-08-12 RX ORDER — TRAZODONE HYDROCHLORIDE 100 MG/1
100 TABLET ORAL NIGHTLY
COMMUNITY
Start: 2025-07-11

## 2025-08-12 RX ORDER — ESCITALOPRAM OXALATE 10 MG/1
10 TABLET ORAL DAILY
Qty: 30 TABLET | Refills: 0 | Status: SHIPPED | OUTPATIENT
Start: 2025-08-12

## 2025-08-12 RX ADMIN — CYANOCOBALAMIN 1000 MCG: 1000 INJECTION, SOLUTION INTRAMUSCULAR; SUBCUTANEOUS at 13:56

## 2025-08-12 ASSESSMENT — ENCOUNTER SYMPTOMS
CARDIOVASCULAR NEGATIVE: 1
GASTROINTESTINAL NEGATIVE: 1
EYES NEGATIVE: 1
NEUROLOGICAL NEGATIVE: 1
RESPIRATORY NEGATIVE: 1
SINUS PRESSURE: 1
ENDOCRINE NEGATIVE: 1
MYALGIAS: 1
PSYCHIATRIC NEGATIVE: 1
FATIGUE: 1
ARTHRALGIAS: 1

## 2025-08-12 ASSESSMENT — PAIN SCALES - GENERAL: PAINLEVEL_OUTOF10: 2

## 2025-08-13 ENCOUNTER — APPOINTMENT (OUTPATIENT)
Dept: PRIMARY CARE | Facility: CLINIC | Age: 45
End: 2025-08-13
Payer: COMMERCIAL

## 2025-08-14 PROBLEM — R06.83 SNORING: Status: ACTIVE | Noted: 2025-08-14

## 2025-08-14 PROBLEM — F32.A DEPRESSION WITH SUICIDAL IDEATION: Status: RESOLVED | Noted: 2025-06-10 | Resolved: 2025-08-14

## 2025-08-14 PROBLEM — R45.851 DEPRESSION WITH SUICIDAL IDEATION: Status: RESOLVED | Noted: 2025-06-10 | Resolved: 2025-08-14

## 2025-08-26 ENCOUNTER — APPOINTMENT (OUTPATIENT)
Dept: RADIOLOGY | Facility: HOSPITAL | Age: 45
End: 2025-08-26
Payer: COMMERCIAL

## 2025-09-03 ENCOUNTER — TELEMEDICINE (OUTPATIENT)
Dept: SLEEP MEDICINE | Facility: CLINIC | Age: 45
End: 2025-09-03
Payer: COMMERCIAL

## 2025-09-03 VITALS — HEIGHT: 65 IN | BODY MASS INDEX: 36.99 KG/M2 | WEIGHT: 222 LBS

## 2025-09-03 DIAGNOSIS — G47.30 SLEEP-DISORDERED BREATHING: Primary | ICD-10-CM

## 2025-09-03 DIAGNOSIS — E66.9 OBESITY (BMI 30-39.9): ICD-10-CM

## 2025-09-03 DIAGNOSIS — G47.52 DREAM ENACTMENT BEHAVIOR: ICD-10-CM

## 2025-09-03 PROCEDURE — 99204 OFFICE O/P NEW MOD 45 MIN: CPT | Performed by: INTERNAL MEDICINE

## 2025-09-03 PROCEDURE — 4004F PT TOBACCO SCREEN RCVD TLK: CPT | Performed by: INTERNAL MEDICINE

## 2025-09-03 PROCEDURE — 3008F BODY MASS INDEX DOCD: CPT | Performed by: INTERNAL MEDICINE

## 2025-09-03 ASSESSMENT — COLUMBIA-SUICIDE SEVERITY RATING SCALE - C-SSRS
2. HAVE YOU ACTUALLY HAD ANY THOUGHTS OF KILLING YOURSELF?: NO
6. HAVE YOU EVER DONE ANYTHING, STARTED TO DO ANYTHING, OR PREPARED TO DO ANYTHING TO END YOUR LIFE?: NO
1. IN THE PAST MONTH, HAVE YOU WISHED YOU WERE DEAD OR WISHED YOU COULD GO TO SLEEP AND NOT WAKE UP?: NO

## 2025-09-03 ASSESSMENT — SLEEP AND FATIGUE QUESTIONNAIRES
SATISFACTION_WITH_CURRENT_SLEEP_PATTERN: SATISFIED
DIFFICULTY_STAYING_ASLEEP: MODERATE
SITING INACTIVE IN A PUBLIC PLACE LIKE A CLASS ROOM OR A MOVIE THEATER: SLIGHT CHANCE OF DOZING
HOW LIKELY ARE YOU TO NOD OFF OR FALL ASLEEP WHILE SITTING AND TALKING TO SOMEONE: WOULD NEVER DOZE
HOW LIKELY ARE YOU TO NOD OFF OR FALL ASLEEP WHILE SITTING QUIETLY AFTER LUNCH WITHOUT ALCOHOL: WOULD NEVER DOZE
SLEEP_PROBLEM_INTERFERES_DAILY_ACTIVITIES: VERY MUCH NOTICEABLE
WORRIED_DISTRESSED_DUE_TO_SLEEP: SOMEWHAT
HOW LIKELY ARE YOU TO NOD OFF OR FALL ASLEEP WHEN YOU ARE A PASSENGER IN A CAR FOR AN HOUR WITHOUT A BREAK: MODERATE CHANCE OF DOZING
HOW LIKELY ARE YOU TO NOD OFF OR FALL ASLEEP IN A CAR, WHILE STOPPED FOR A FEW MINUTES IN TRAFFIC: WOULD NEVER DOZE
WAKING_TOO_EARLY: MILD
HOW LIKELY ARE YOU TO NOD OFF OR FALL ASLEEP WHILE LYING DOWN TO REST IN THE AFTERNOON WHEN CIRCUMSTANCES PERMIT: HIGH CHANCE OF DOZING
HOW LIKELY ARE YOU TO NOD OFF OR FALL ASLEEP WHILE SITTING AND READING: MODERATE CHANCE OF DOZING
HOW LIKELY ARE YOU TO NOD OFF OR FALL ASLEEP WHILE WATCHING TV: HIGH CHANCE OF DOZING
SLEEP_PROBLEM_NOTICEABLE_TO_OTHERS: MUCH
ESS-CHAD TOTAL SCORE: 11
DIFFICULTY_FALLING_ASLEEP: MODERATE

## 2025-09-03 ASSESSMENT — PATIENT HEALTH QUESTIONNAIRE - PHQ9
SUM OF ALL RESPONSES TO PHQ9 QUESTIONS 1 AND 2: 0
2. FEELING DOWN, DEPRESSED OR HOPELESS: NOT AT ALL
1. LITTLE INTEREST OR PLEASURE IN DOING THINGS: NOT AT ALL

## 2025-09-03 ASSESSMENT — PAIN SCALES - GENERAL: PAINLEVEL_OUTOF10: 0-NO PAIN

## 2025-09-03 ASSESSMENT — ENCOUNTER SYMPTOMS: DEPRESSION: 0

## 2025-12-16 ENCOUNTER — APPOINTMENT (OUTPATIENT)
Dept: PRIMARY CARE | Facility: CLINIC | Age: 45
End: 2025-12-16
Payer: COMMERCIAL